# Patient Record
Sex: MALE | Race: BLACK OR AFRICAN AMERICAN | Employment: OTHER | URBAN - METROPOLITAN AREA
[De-identification: names, ages, dates, MRNs, and addresses within clinical notes are randomized per-mention and may not be internally consistent; named-entity substitution may affect disease eponyms.]

---

## 2020-07-17 ENCOUNTER — APPOINTMENT (OUTPATIENT)
Dept: GENERAL RADIOLOGY | Age: 45
DRG: 309 | End: 2020-07-17
Payer: MEDICAID

## 2020-07-17 ENCOUNTER — APPOINTMENT (OUTPATIENT)
Dept: CT IMAGING | Age: 45
DRG: 309 | End: 2020-07-17
Payer: MEDICAID

## 2020-07-17 ENCOUNTER — HOSPITAL ENCOUNTER (OUTPATIENT)
Age: 45
Setting detail: OBSERVATION
Discharge: HOME HEALTH | DRG: 309 | End: 2020-07-20
Attending: EMERGENCY MEDICINE | Admitting: ORTHOPAEDIC SURGERY
Payer: MEDICAID

## 2020-07-17 PROBLEM — S82.142A CLOSED FRACTURE OF LEFT TIBIAL PLATEAU: Status: ACTIVE | Noted: 2020-07-17

## 2020-07-17 LAB
ABO/RH: NORMAL
ANTIBODY SCREEN: NORMAL
APTT: 25.9 SEC (ref 24.5–35.1)
BASOPHILS ABSOLUTE: 0.04 E9/L (ref 0–0.2)
BASOPHILS RELATIVE PERCENT: 0.3 % (ref 0–2)
EOSINOPHILS ABSOLUTE: 0.1 E9/L (ref 0.05–0.5)
EOSINOPHILS RELATIVE PERCENT: 0.8 % (ref 0–6)
HCT VFR BLD CALC: 41.1 % (ref 37–54)
HEMOGLOBIN: 12.8 G/DL (ref 12.5–16.5)
IMMATURE GRANULOCYTES #: 0.07 E9/L
IMMATURE GRANULOCYTES %: 0.6 % (ref 0–5)
INR BLD: 1
LYMPHOCYTES ABSOLUTE: 1.87 E9/L (ref 1.5–4)
LYMPHOCYTES RELATIVE PERCENT: 15.6 % (ref 20–42)
MCH RBC QN AUTO: 29.8 PG (ref 26–35)
MCHC RBC AUTO-ENTMCNC: 31.1 % (ref 32–34.5)
MCV RBC AUTO: 95.6 FL (ref 80–99.9)
MONOCYTES ABSOLUTE: 0.55 E9/L (ref 0.1–0.95)
MONOCYTES RELATIVE PERCENT: 4.6 % (ref 2–12)
NEUTROPHILS ABSOLUTE: 9.38 E9/L (ref 1.8–7.3)
NEUTROPHILS RELATIVE PERCENT: 78.1 % (ref 43–80)
PDW BLD-RTO: 12.8 FL (ref 11.5–15)
PLATELET # BLD: 270 E9/L (ref 130–450)
PMV BLD AUTO: 10 FL (ref 7–12)
PROTHROMBIN TIME: 11.2 SEC (ref 9.3–12.4)
RBC # BLD: 4.3 E12/L (ref 3.8–5.8)
WBC # BLD: 12 E9/L (ref 4.5–11.5)

## 2020-07-17 PROCEDURE — 6360000002 HC RX W HCPCS: Performed by: STUDENT IN AN ORGANIZED HEALTH CARE EDUCATION/TRAINING PROGRAM

## 2020-07-17 PROCEDURE — 6360000002 HC RX W HCPCS: Performed by: EMERGENCY MEDICINE

## 2020-07-17 PROCEDURE — G0378 HOSPITAL OBSERVATION PER HR: HCPCS

## 2020-07-17 PROCEDURE — 71045 X-RAY EXAM CHEST 1 VIEW: CPT

## 2020-07-17 PROCEDURE — 86900 BLOOD TYPING SEROLOGIC ABO: CPT

## 2020-07-17 PROCEDURE — 99222 1ST HOSP IP/OBS MODERATE 55: CPT | Performed by: ORTHOPAEDIC SURGERY

## 2020-07-17 PROCEDURE — 86901 BLOOD TYPING SEROLOGIC RH(D): CPT

## 2020-07-17 PROCEDURE — 96375 TX/PRO/DX INJ NEW DRUG ADDON: CPT

## 2020-07-17 PROCEDURE — 73564 X-RAY EXAM KNEE 4 OR MORE: CPT

## 2020-07-17 PROCEDURE — 85730 THROMBOPLASTIN TIME PARTIAL: CPT

## 2020-07-17 PROCEDURE — 85610 PROTHROMBIN TIME: CPT

## 2020-07-17 PROCEDURE — 85025 COMPLETE CBC W/AUTO DIFF WBC: CPT

## 2020-07-17 PROCEDURE — 96374 THER/PROPH/DIAG INJ IV PUSH: CPT

## 2020-07-17 PROCEDURE — 73590 X-RAY EXAM OF LOWER LEG: CPT

## 2020-07-17 PROCEDURE — 73700 CT LOWER EXTREMITY W/O DYE: CPT

## 2020-07-17 PROCEDURE — 93005 ELECTROCARDIOGRAM TRACING: CPT | Performed by: ORTHOPAEDIC SURGERY

## 2020-07-17 PROCEDURE — 86850 RBC ANTIBODY SCREEN: CPT

## 2020-07-17 PROCEDURE — 2580000003 HC RX 258: Performed by: EMERGENCY MEDICINE

## 2020-07-17 PROCEDURE — 99284 EMERGENCY DEPT VISIT MOD MDM: CPT

## 2020-07-17 RX ORDER — FENTANYL CITRATE 50 UG/ML
50 INJECTION, SOLUTION INTRAMUSCULAR; INTRAVENOUS ONCE
Status: COMPLETED | OUTPATIENT
Start: 2020-07-17 | End: 2020-07-17

## 2020-07-17 RX ORDER — SODIUM CHLORIDE 0.9 % (FLUSH) 0.9 %
10 SYRINGE (ML) INJECTION EVERY 12 HOURS SCHEDULED
Status: DISCONTINUED | OUTPATIENT
Start: 2020-07-18 | End: 2020-07-18 | Stop reason: SDUPTHER

## 2020-07-17 RX ORDER — SODIUM CHLORIDE 9 MG/ML
INJECTION, SOLUTION INTRAVENOUS CONTINUOUS
Status: DISCONTINUED | OUTPATIENT
Start: 2020-07-17 | End: 2020-07-20 | Stop reason: HOSPADM

## 2020-07-17 RX ORDER — OXYCODONE HYDROCHLORIDE 10 MG/1
10 TABLET ORAL EVERY 4 HOURS PRN
Status: DISCONTINUED | OUTPATIENT
Start: 2020-07-17 | End: 2020-07-20 | Stop reason: HOSPADM

## 2020-07-17 RX ORDER — MORPHINE SULFATE 4 MG/ML
4 INJECTION, SOLUTION INTRAMUSCULAR; INTRAVENOUS
Status: DISCONTINUED | OUTPATIENT
Start: 2020-07-17 | End: 2020-07-20 | Stop reason: HOSPADM

## 2020-07-17 RX ORDER — SODIUM CHLORIDE 0.9 % (FLUSH) 0.9 %
10 SYRINGE (ML) INJECTION PRN
Status: DISCONTINUED | OUTPATIENT
Start: 2020-07-17 | End: 2020-07-18 | Stop reason: SDUPTHER

## 2020-07-17 RX ORDER — MORPHINE SULFATE 2 MG/ML
2 INJECTION, SOLUTION INTRAMUSCULAR; INTRAVENOUS
Status: DISCONTINUED | OUTPATIENT
Start: 2020-07-17 | End: 2020-07-20 | Stop reason: HOSPADM

## 2020-07-17 RX ORDER — DIPHENHYDRAMINE HYDROCHLORIDE 50 MG/ML
25 INJECTION INTRAMUSCULAR; INTRAVENOUS EVERY 6 HOURS PRN
Status: DISCONTINUED | OUTPATIENT
Start: 2020-07-17 | End: 2020-07-20 | Stop reason: HOSPADM

## 2020-07-17 RX ORDER — OXYCODONE HYDROCHLORIDE 5 MG/1
5 TABLET ORAL EVERY 4 HOURS PRN
Status: DISCONTINUED | OUTPATIENT
Start: 2020-07-17 | End: 2020-07-20 | Stop reason: HOSPADM

## 2020-07-17 RX ORDER — FENTANYL CITRATE 50 UG/ML
INJECTION, SOLUTION INTRAMUSCULAR; INTRAVENOUS
Status: DISPENSED
Start: 2020-07-17 | End: 2020-07-18

## 2020-07-17 RX ADMIN — FENTANYL CITRATE 50 MCG: 50 INJECTION, SOLUTION INTRAMUSCULAR; INTRAVENOUS at 20:30

## 2020-07-17 RX ADMIN — MORPHINE SULFATE 4 MG: 4 INJECTION, SOLUTION INTRAMUSCULAR; INTRAVENOUS at 23:38

## 2020-07-17 RX ADMIN — SODIUM CHLORIDE: 9 INJECTION, SOLUTION INTRAVENOUS at 20:29

## 2020-07-17 ASSESSMENT — PAIN DESCRIPTION - PAIN TYPE
TYPE: ACUTE PAIN
TYPE: ACUTE PAIN

## 2020-07-17 ASSESSMENT — PAIN SCALES - GENERAL
PAINLEVEL_OUTOF10: 10

## 2020-07-17 ASSESSMENT — PAIN DESCRIPTION - FREQUENCY: FREQUENCY: CONTINUOUS

## 2020-07-17 ASSESSMENT — PAIN DESCRIPTION - LOCATION
LOCATION: KNEE
LOCATION: KNEE

## 2020-07-17 ASSESSMENT — PAIN DESCRIPTION - ONSET: ONSET: ON-GOING

## 2020-07-17 ASSESSMENT — PAIN DESCRIPTION - ORIENTATION
ORIENTATION: LEFT
ORIENTATION: LEFT

## 2020-07-17 ASSESSMENT — PAIN DESCRIPTION - DESCRIPTORS: DESCRIPTORS: ACHING;SHARP;SHOOTING;THROBBING

## 2020-07-18 ENCOUNTER — ANESTHESIA EVENT (OUTPATIENT)
Dept: OPERATING ROOM | Age: 45
DRG: 309 | End: 2020-07-18
Payer: MEDICAID

## 2020-07-18 ENCOUNTER — APPOINTMENT (OUTPATIENT)
Dept: GENERAL RADIOLOGY | Age: 45
DRG: 309 | End: 2020-07-18
Payer: MEDICAID

## 2020-07-18 ENCOUNTER — ANESTHESIA (OUTPATIENT)
Dept: OPERATING ROOM | Age: 45
DRG: 309 | End: 2020-07-18
Payer: MEDICAID

## 2020-07-18 VITALS — TEMPERATURE: 93.7 F | OXYGEN SATURATION: 100 % | SYSTOLIC BLOOD PRESSURE: 103 MMHG | DIASTOLIC BLOOD PRESSURE: 69 MMHG

## 2020-07-18 LAB
ANION GAP SERPL CALCULATED.3IONS-SCNC: 14 MMOL/L (ref 7–16)
BUN BLDV-MCNC: 7 MG/DL (ref 6–20)
CALCIUM SERPL-MCNC: 8.7 MG/DL (ref 8.6–10.2)
CHLORIDE BLD-SCNC: 102 MMOL/L (ref 98–107)
CO2: 23 MMOL/L (ref 22–29)
CREAT SERPL-MCNC: 0.9 MG/DL (ref 0.7–1.2)
GFR AFRICAN AMERICAN: >60
GFR NON-AFRICAN AMERICAN: >60 ML/MIN/1.73
GLUCOSE BLD-MCNC: 98 MG/DL (ref 74–99)
HCT VFR BLD CALC: 37.7 % (ref 37–54)
HEMOGLOBIN: 11.8 G/DL (ref 12.5–16.5)
MCH RBC QN AUTO: 29.6 PG (ref 26–35)
MCHC RBC AUTO-ENTMCNC: 31.3 % (ref 32–34.5)
MCV RBC AUTO: 94.5 FL (ref 80–99.9)
PDW BLD-RTO: 12.9 FL (ref 11.5–15)
PLATELET # BLD: 218 E9/L (ref 130–450)
PMV BLD AUTO: 10.1 FL (ref 7–12)
POTASSIUM REFLEX MAGNESIUM: 3.7 MMOL/L (ref 3.5–5)
RBC # BLD: 3.99 E12/L (ref 3.8–5.8)
SODIUM BLD-SCNC: 139 MMOL/L (ref 132–146)
WBC # BLD: 9.7 E9/L (ref 4.5–11.5)

## 2020-07-18 PROCEDURE — 2580000003 HC RX 258

## 2020-07-18 PROCEDURE — 2500000003 HC RX 250 WO HCPCS

## 2020-07-18 PROCEDURE — 6360000002 HC RX W HCPCS: Performed by: ANESTHESIOLOGIST ASSISTANT

## 2020-07-18 PROCEDURE — 3700000000 HC ANESTHESIA ATTENDED CARE: Performed by: ORTHOPAEDIC SURGERY

## 2020-07-18 PROCEDURE — 6370000000 HC RX 637 (ALT 250 FOR IP): Performed by: FAMILY MEDICINE

## 2020-07-18 PROCEDURE — 6360000002 HC RX W HCPCS: Performed by: STUDENT IN AN ORGANIZED HEALTH CARE EDUCATION/TRAINING PROGRAM

## 2020-07-18 PROCEDURE — G0378 HOSPITAL OBSERVATION PER HR: HCPCS

## 2020-07-18 PROCEDURE — 6370000000 HC RX 637 (ALT 250 FOR IP): Performed by: STUDENT IN AN ORGANIZED HEALTH CARE EDUCATION/TRAINING PROGRAM

## 2020-07-18 PROCEDURE — 85027 COMPLETE CBC AUTOMATED: CPT

## 2020-07-18 PROCEDURE — 27536 TREAT KNEE FRACTURE: CPT | Performed by: ORTHOPAEDIC SURGERY

## 2020-07-18 PROCEDURE — 6370000000 HC RX 637 (ALT 250 FOR IP): Performed by: ORTHOPAEDIC SURGERY

## 2020-07-18 PROCEDURE — 7100000000 HC PACU RECOVERY - FIRST 15 MIN: Performed by: ORTHOPAEDIC SURGERY

## 2020-07-18 PROCEDURE — 6360000002 HC RX W HCPCS: Performed by: INTERNAL MEDICINE

## 2020-07-18 PROCEDURE — 36415 COLL VENOUS BLD VENIPUNCTURE: CPT

## 2020-07-18 PROCEDURE — 3209999900 FLUORO FOR SURGICAL PROCEDURES

## 2020-07-18 PROCEDURE — C1713 ANCHOR/SCREW BN/BN,TIS/BN: HCPCS | Performed by: ORTHOPAEDIC SURGERY

## 2020-07-18 PROCEDURE — 3700000001 HC ADD 15 MINUTES (ANESTHESIA): Performed by: ORTHOPAEDIC SURGERY

## 2020-07-18 PROCEDURE — 6360000002 HC RX W HCPCS

## 2020-07-18 PROCEDURE — 3600000005 HC SURGERY LEVEL 5 BASE: Performed by: ORTHOPAEDIC SURGERY

## 2020-07-18 PROCEDURE — 2580000003 HC RX 258: Performed by: STUDENT IN AN ORGANIZED HEALTH CARE EDUCATION/TRAINING PROGRAM

## 2020-07-18 PROCEDURE — 7100000001 HC PACU RECOVERY - ADDTL 15 MIN: Performed by: ORTHOPAEDIC SURGERY

## 2020-07-18 PROCEDURE — 73590 X-RAY EXAM OF LOWER LEG: CPT

## 2020-07-18 PROCEDURE — 2709999900 HC NON-CHARGEABLE SUPPLY: Performed by: ORTHOPAEDIC SURGERY

## 2020-07-18 PROCEDURE — 73560 X-RAY EXAM OF KNEE 1 OR 2: CPT

## 2020-07-18 PROCEDURE — 3600000015 HC SURGERY LEVEL 5 ADDTL 15MIN: Performed by: ORTHOPAEDIC SURGERY

## 2020-07-18 PROCEDURE — 2500000003 HC RX 250 WO HCPCS: Performed by: ANESTHESIOLOGIST ASSISTANT

## 2020-07-18 PROCEDURE — 80048 BASIC METABOLIC PNL TOTAL CA: CPT

## 2020-07-18 PROCEDURE — 2700000000 HC OXYGEN THERAPY PER DAY

## 2020-07-18 PROCEDURE — 2720000010 HC SURG SUPPLY STERILE: Performed by: ORTHOPAEDIC SURGERY

## 2020-07-18 DEVICE — SCREW BNE L28MM DIA4.5MM PROX CORT TIB S STL ST LOK FULL: Type: IMPLANTABLE DEVICE | Site: TIBIA | Status: FUNCTIONAL

## 2020-07-18 DEVICE — IMPLANTABLE DEVICE: Type: IMPLANTABLE DEVICE | Site: TIBIA | Status: FUNCTIONAL

## 2020-07-18 DEVICE — SCREW BNE L30MM DIA4.5MM PROX CORT TIB S STL ST LOK FULL: Type: IMPLANTABLE DEVICE | Site: TIBIA | Status: FUNCTIONAL

## 2020-07-18 DEVICE — SCREW BNE L32MM DIA4.5MM PROX CORT TIB S STL ST LOK FULL: Type: IMPLANTABLE DEVICE | Site: TIBIA | Status: FUNCTIONAL

## 2020-07-18 DEVICE — GRAFT BNE SUB M 5ML CANC DBM FRMBL CELLULAR VIVIGEN: Type: IMPLANTABLE DEVICE | Site: TIBIA | Status: FUNCTIONAL

## 2020-07-18 DEVICE — SCREW BNE L70MM DIA5MM S STL ST LOK FULL THRD T25 STARDRV: Type: IMPLANTABLE DEVICE | Site: TIBIA | Status: FUNCTIONAL

## 2020-07-18 DEVICE — SCREW BNE L40MM DIA4.5MM PROX CORT TIB S STL ST LOK FULL: Type: IMPLANTABLE DEVICE | Site: TIBIA | Status: FUNCTIONAL

## 2020-07-18 DEVICE — SCREW BNE L85MM DIA5MM S STL ST LOK FULL THRD T25 STARDRV: Type: IMPLANTABLE DEVICE | Site: TIBIA | Status: FUNCTIONAL

## 2020-07-18 DEVICE — SCREW BNE L80MM DIA5MM S STL ST LOK FULL THRD T25 STARDRV: Type: IMPLANTABLE DEVICE | Site: TIBIA | Status: FUNCTIONAL

## 2020-07-18 RX ORDER — KETOROLAC TROMETHAMINE 30 MG/ML
15 INJECTION, SOLUTION INTRAMUSCULAR; INTRAVENOUS EVERY 6 HOURS PRN
Status: DISCONTINUED | OUTPATIENT
Start: 2020-07-18 | End: 2020-07-20 | Stop reason: HOSPADM

## 2020-07-18 RX ORDER — GLYCOPYRROLATE 1 MG/5 ML
SYRINGE (ML) INTRAVENOUS PRN
Status: DISCONTINUED | OUTPATIENT
Start: 2020-07-18 | End: 2020-07-18 | Stop reason: SDUPTHER

## 2020-07-18 RX ORDER — SODIUM CHLORIDE 0.9 % (FLUSH) 0.9 %
10 SYRINGE (ML) INJECTION PRN
Status: DISCONTINUED | OUTPATIENT
Start: 2020-07-18 | End: 2020-07-20 | Stop reason: HOSPADM

## 2020-07-18 RX ORDER — ONDANSETRON 2 MG/ML
INJECTION INTRAMUSCULAR; INTRAVENOUS PRN
Status: DISCONTINUED | OUTPATIENT
Start: 2020-07-18 | End: 2020-07-18 | Stop reason: SDUPTHER

## 2020-07-18 RX ORDER — HYDROMORPHONE HCL 110MG/55ML
PATIENT CONTROLLED ANALGESIA SYRINGE INTRAVENOUS PRN
Status: DISCONTINUED | OUTPATIENT
Start: 2020-07-18 | End: 2020-07-18 | Stop reason: SDUPTHER

## 2020-07-18 RX ORDER — MORPHINE SULFATE 2 MG/ML
1 INJECTION, SOLUTION INTRAMUSCULAR; INTRAVENOUS EVERY 5 MIN PRN
Status: DISCONTINUED | OUTPATIENT
Start: 2020-07-18 | End: 2020-07-18 | Stop reason: HOSPADM

## 2020-07-18 RX ORDER — IBUPROFEN 400 MG/1
400 TABLET ORAL EVERY 6 HOURS PRN
Status: DISCONTINUED | OUTPATIENT
Start: 2020-07-18 | End: 2020-07-20 | Stop reason: HOSPADM

## 2020-07-18 RX ORDER — PROMETHAZINE HYDROCHLORIDE 25 MG/ML
6.25 INJECTION, SOLUTION INTRAMUSCULAR; INTRAVENOUS EVERY 10 MIN PRN
Status: DISCONTINUED | OUTPATIENT
Start: 2020-07-18 | End: 2020-07-18 | Stop reason: HOSPADM

## 2020-07-18 RX ORDER — MEPERIDINE HYDROCHLORIDE 25 MG/ML
12.5 INJECTION INTRAMUSCULAR; INTRAVENOUS; SUBCUTANEOUS EVERY 5 MIN PRN
Status: DISCONTINUED | OUTPATIENT
Start: 2020-07-18 | End: 2020-07-18 | Stop reason: HOSPADM

## 2020-07-18 RX ORDER — ROCURONIUM BROMIDE 10 MG/ML
INJECTION, SOLUTION INTRAVENOUS PRN
Status: DISCONTINUED | OUTPATIENT
Start: 2020-07-18 | End: 2020-07-18 | Stop reason: SDUPTHER

## 2020-07-18 RX ORDER — NEOSTIGMINE METHYLSULFATE 1 MG/ML
INJECTION, SOLUTION INTRAVENOUS PRN
Status: DISCONTINUED | OUTPATIENT
Start: 2020-07-18 | End: 2020-07-18 | Stop reason: SDUPTHER

## 2020-07-18 RX ORDER — SODIUM CHLORIDE 9 MG/ML
INJECTION, SOLUTION INTRAVENOUS CONTINUOUS PRN
Status: DISCONTINUED | OUTPATIENT
Start: 2020-07-18 | End: 2020-07-18 | Stop reason: SDUPTHER

## 2020-07-18 RX ORDER — DEXAMETHASONE SODIUM PHOSPHATE 10 MG/ML
INJECTION INTRAMUSCULAR; INTRAVENOUS PRN
Status: DISCONTINUED | OUTPATIENT
Start: 2020-07-18 | End: 2020-07-18 | Stop reason: SDUPTHER

## 2020-07-18 RX ORDER — PROPOFOL 10 MG/ML
INJECTION, EMULSION INTRAVENOUS PRN
Status: DISCONTINUED | OUTPATIENT
Start: 2020-07-18 | End: 2020-07-18 | Stop reason: SDUPTHER

## 2020-07-18 RX ORDER — HYDROCODONE BITARTRATE AND ACETAMINOPHEN 5; 325 MG/1; MG/1
1 TABLET ORAL PRN
Status: DISCONTINUED | OUTPATIENT
Start: 2020-07-18 | End: 2020-07-18 | Stop reason: HOSPADM

## 2020-07-18 RX ORDER — ASPIRIN 325 MG
325 TABLET, DELAYED RELEASE (ENTERIC COATED) ORAL 2 TIMES DAILY
Qty: 56 TABLET | Refills: 0 | Status: SHIPPED | OUTPATIENT
Start: 2020-07-18 | End: 2020-08-15

## 2020-07-18 RX ORDER — HYDROCODONE BITARTRATE AND ACETAMINOPHEN 5; 325 MG/1; MG/1
2 TABLET ORAL PRN
Status: DISCONTINUED | OUTPATIENT
Start: 2020-07-18 | End: 2020-07-18 | Stop reason: HOSPADM

## 2020-07-18 RX ORDER — SODIUM CHLORIDE 0.9 % (FLUSH) 0.9 %
10 SYRINGE (ML) INJECTION EVERY 12 HOURS SCHEDULED
Status: DISCONTINUED | OUTPATIENT
Start: 2020-07-18 | End: 2020-07-20 | Stop reason: HOSPADM

## 2020-07-18 RX ORDER — OXYCODONE HYDROCHLORIDE 5 MG/1
5 TABLET ORAL EVERY 6 HOURS PRN
Qty: 28 TABLET | Refills: 0 | Status: SHIPPED | OUTPATIENT
Start: 2020-07-18 | End: 2020-07-25

## 2020-07-18 RX ORDER — FENTANYL CITRATE 50 UG/ML
INJECTION, SOLUTION INTRAMUSCULAR; INTRAVENOUS PRN
Status: DISCONTINUED | OUTPATIENT
Start: 2020-07-18 | End: 2020-07-18 | Stop reason: SDUPTHER

## 2020-07-18 RX ORDER — MIDAZOLAM HYDROCHLORIDE 1 MG/ML
INJECTION INTRAMUSCULAR; INTRAVENOUS PRN
Status: DISCONTINUED | OUTPATIENT
Start: 2020-07-18 | End: 2020-07-18 | Stop reason: SDUPTHER

## 2020-07-18 RX ORDER — MORPHINE SULFATE 2 MG/ML
2 INJECTION, SOLUTION INTRAMUSCULAR; INTRAVENOUS EVERY 5 MIN PRN
Status: DISCONTINUED | OUTPATIENT
Start: 2020-07-18 | End: 2020-07-18 | Stop reason: HOSPADM

## 2020-07-18 RX ORDER — DIAPER,BRIEF,INFANT-TODD,DISP
EACH MISCELLANEOUS PRN
Status: DISCONTINUED | OUTPATIENT
Start: 2020-07-18 | End: 2020-07-18 | Stop reason: ALTCHOICE

## 2020-07-18 RX ORDER — LIDOCAINE HYDROCHLORIDE 20 MG/ML
INJECTION, SOLUTION INTRAVENOUS PRN
Status: DISCONTINUED | OUTPATIENT
Start: 2020-07-18 | End: 2020-07-18 | Stop reason: SDUPTHER

## 2020-07-18 RX ADMIN — MIDAZOLAM 2 MG: 1 INJECTION INTRAMUSCULAR; INTRAVENOUS at 14:33

## 2020-07-18 RX ADMIN — PROPOFOL 200 MG: 10 INJECTION, EMULSION INTRAVENOUS at 14:37

## 2020-07-18 RX ADMIN — MORPHINE SULFATE 2 MG: 2 INJECTION, SOLUTION INTRAMUSCULAR; INTRAVENOUS at 12:50

## 2020-07-18 RX ADMIN — FENTANYL CITRATE 100 MCG: 50 INJECTION, SOLUTION INTRAMUSCULAR; INTRAVENOUS at 14:33

## 2020-07-18 RX ADMIN — Medication 0.6 MG: at 16:06

## 2020-07-18 RX ADMIN — OXYCODONE HYDROCHLORIDE 10 MG: 10 TABLET ORAL at 00:27

## 2020-07-18 RX ADMIN — SODIUM CHLORIDE: 9 INJECTION, SOLUTION INTRAVENOUS at 14:33

## 2020-07-18 RX ADMIN — DEXAMETHASONE SODIUM PHOSPHATE 10 MG: 10 INJECTION INTRAMUSCULAR; INTRAVENOUS at 14:37

## 2020-07-18 RX ADMIN — HYDROMORPHONE HYDROCHLORIDE 1 MG: 2 INJECTION, SOLUTION INTRAMUSCULAR; INTRAVENOUS; SUBCUTANEOUS at 15:00

## 2020-07-18 RX ADMIN — LIDOCAINE HYDROCHLORIDE 100 MG: 20 INJECTION, SOLUTION INTRAVENOUS at 14:37

## 2020-07-18 RX ADMIN — KETOROLAC TROMETHAMINE 15 MG: 30 INJECTION, SOLUTION INTRAMUSCULAR at 01:10

## 2020-07-18 RX ADMIN — Medication 10 ML: at 20:32

## 2020-07-18 RX ADMIN — MORPHINE SULFATE 4 MG: 4 INJECTION, SOLUTION INTRAMUSCULAR; INTRAVENOUS at 08:18

## 2020-07-18 RX ADMIN — ROCURONIUM BROMIDE 50 MG: 10 INJECTION, SOLUTION INTRAVENOUS at 14:37

## 2020-07-18 RX ADMIN — Medication 2 G: at 14:47

## 2020-07-18 RX ADMIN — HYDROMORPHONE HYDROCHLORIDE 1 MG: 2 INJECTION, SOLUTION INTRAMUSCULAR; INTRAVENOUS; SUBCUTANEOUS at 14:52

## 2020-07-18 RX ADMIN — ONDANSETRON HYDROCHLORIDE 4 MG: 2 INJECTION, SOLUTION INTRAMUSCULAR; INTRAVENOUS at 14:37

## 2020-07-18 RX ADMIN — ASPIRIN 325 MG: 325 TABLET, COATED ORAL at 20:32

## 2020-07-18 RX ADMIN — Medication 2 G: at 23:59

## 2020-07-18 RX ADMIN — SODIUM CHLORIDE: 9 INJECTION, SOLUTION INTRAVENOUS at 15:04

## 2020-07-18 RX ADMIN — IBUPROFEN 400 MG: 400 TABLET, FILM COATED ORAL at 23:43

## 2020-07-18 RX ADMIN — FENTANYL CITRATE 150 MCG: 50 INJECTION, SOLUTION INTRAMUSCULAR; INTRAVENOUS at 14:37

## 2020-07-18 RX ADMIN — SODIUM CHLORIDE, PRESERVATIVE FREE 10 ML: 5 INJECTION INTRAVENOUS at 12:51

## 2020-07-18 RX ADMIN — Medication 3 MG: at 16:06

## 2020-07-18 ASSESSMENT — PULMONARY FUNCTION TESTS
PIF_VALUE: 23
PIF_VALUE: 24
PIF_VALUE: 23
PIF_VALUE: 24
PIF_VALUE: 23
PIF_VALUE: 23
PIF_VALUE: 24
PIF_VALUE: 23
PIF_VALUE: 24
PIF_VALUE: 16
PIF_VALUE: 23
PIF_VALUE: 23
PIF_VALUE: 20
PIF_VALUE: 20
PIF_VALUE: 23
PIF_VALUE: 23
PIF_VALUE: 20
PIF_VALUE: 23
PIF_VALUE: 24
PIF_VALUE: 12
PIF_VALUE: 24
PIF_VALUE: 23
PIF_VALUE: 19
PIF_VALUE: 24
PIF_VALUE: 23
PIF_VALUE: 5
PIF_VALUE: 16
PIF_VALUE: 24
PIF_VALUE: 23
PIF_VALUE: 20
PIF_VALUE: 22
PIF_VALUE: 2
PIF_VALUE: 24
PIF_VALUE: 24
PIF_VALUE: 23
PIF_VALUE: 23
PIF_VALUE: 24
PIF_VALUE: 23
PIF_VALUE: 16
PIF_VALUE: 23
PIF_VALUE: 16
PIF_VALUE: 24
PIF_VALUE: 22
PIF_VALUE: 23
PIF_VALUE: 20
PIF_VALUE: 23
PIF_VALUE: 23
PIF_VALUE: 16
PIF_VALUE: 24
PIF_VALUE: 24
PIF_VALUE: 2
PIF_VALUE: 23
PIF_VALUE: 22
PIF_VALUE: 2
PIF_VALUE: 23
PIF_VALUE: 22
PIF_VALUE: 23
PIF_VALUE: 22
PIF_VALUE: 23
PIF_VALUE: 23
PIF_VALUE: 24
PIF_VALUE: 23
PIF_VALUE: 23
PIF_VALUE: 24
PIF_VALUE: 22
PIF_VALUE: 23
PIF_VALUE: 23
PIF_VALUE: 2
PIF_VALUE: 21
PIF_VALUE: 23
PIF_VALUE: 24
PIF_VALUE: 23
PIF_VALUE: 24
PIF_VALUE: 17
PIF_VALUE: 23
PIF_VALUE: 0
PIF_VALUE: 23
PIF_VALUE: 24
PIF_VALUE: 23
PIF_VALUE: 21
PIF_VALUE: 0
PIF_VALUE: 23
PIF_VALUE: 2
PIF_VALUE: 19
PIF_VALUE: 23
PIF_VALUE: 21
PIF_VALUE: 23
PIF_VALUE: 22
PIF_VALUE: 23
PIF_VALUE: 23
PIF_VALUE: 0

## 2020-07-18 ASSESSMENT — PAIN DESCRIPTION - FREQUENCY
FREQUENCY: CONTINUOUS

## 2020-07-18 ASSESSMENT — PAIN - FUNCTIONAL ASSESSMENT
PAIN_FUNCTIONAL_ASSESSMENT: PREVENTS OR INTERFERES SOME ACTIVE ACTIVITIES AND ADLS
PAIN_FUNCTIONAL_ASSESSMENT: PREVENTS OR INTERFERES SOME ACTIVE ACTIVITIES AND ADLS

## 2020-07-18 ASSESSMENT — PAIN SCALES - WONG BAKER: WONGBAKER_NUMERICALRESPONSE: 0

## 2020-07-18 ASSESSMENT — PAIN DESCRIPTION - LOCATION
LOCATION: KNEE
LOCATION: LEG
LOCATION: LEG
LOCATION: KNEE
LOCATION: HIP;LEG
LOCATION: LEG

## 2020-07-18 ASSESSMENT — PAIN DESCRIPTION - ONSET
ONSET: ON-GOING

## 2020-07-18 ASSESSMENT — PAIN SCALES - GENERAL
PAINLEVEL_OUTOF10: 0
PAINLEVEL_OUTOF10: 8
PAINLEVEL_OUTOF10: 0
PAINLEVEL_OUTOF10: 10
PAINLEVEL_OUTOF10: 7
PAINLEVEL_OUTOF10: 8
PAINLEVEL_OUTOF10: 0
PAINLEVEL_OUTOF10: 10
PAINLEVEL_OUTOF10: 6
PAINLEVEL_OUTOF10: 0
PAINLEVEL_OUTOF10: 0
PAINLEVEL_OUTOF10: 9
PAINLEVEL_OUTOF10: 10

## 2020-07-18 ASSESSMENT — PAIN DESCRIPTION - PAIN TYPE
TYPE: ACUTE PAIN
TYPE: SURGICAL PAIN
TYPE: ACUTE PAIN

## 2020-07-18 ASSESSMENT — LIFESTYLE VARIABLES: SMOKING_STATUS: 1

## 2020-07-18 ASSESSMENT — PAIN DESCRIPTION - DESCRIPTORS
DESCRIPTORS: ACHING;CONSTANT;DISCOMFORT
DESCRIPTORS: ACHING;SHARP;SHOOTING;THROBBING
DESCRIPTORS: ACHING;SHARP;SHOOTING;THROBBING
DESCRIPTORS: ACHING;CONSTANT;DISCOMFORT
DESCRIPTORS: SHARP;SHOOTING;ACHING
DESCRIPTORS: OTHER (COMMENT)

## 2020-07-18 ASSESSMENT — PAIN DESCRIPTION - PROGRESSION
CLINICAL_PROGRESSION: NOT CHANGED
CLINICAL_PROGRESSION: NOT CHANGED

## 2020-07-18 ASSESSMENT — PAIN DESCRIPTION - ORIENTATION
ORIENTATION: LEFT

## 2020-07-18 NOTE — H&P
Department of Orthopedic Surgery  History and physical exam          Reason for Consult:  Left knee pain    HISTORY OF PRESENT ILLNESS:       Patient is a 38 y.o. male who presents with left knee pain after direct trauma. Patient states he was moving a basketball hoop when he fell injuring his left leg. Patient does not remember the exact mechanism of injury. Anticoagulation - none. Patient states he had multiple sprains to left knee when he was young. Patient denies any difficulty with ambulation left leg prior to injury. Denies numbness/tingling/paresthesias. Denies any other orthopedic complaints at this time. Past Medical History:    History reviewed. No pertinent past medical history. Past Surgical History:    History reviewed. No pertinent surgical history. Current Medications:   Current Facility-Administered Medications: 0.9 % sodium chloride infusion, , Intravenous, Continuous  Allergies:  Patient has no known allergies. Social History:   TOBACCO:   reports that he has never smoked. He has never used smokeless tobacco.  ETOH:   has no history on file for alcohol. DRUGS:   has no history on file for drug. ACTIVITIES OF DAILY LIVING:    OCCUPATION:    Family History:   History reviewed. No pertinent family history.     REVIEW OF SYSTEMS:  CONSTITUTIONAL:  negative for  fevers, chills  EYES:  negative for blurred vision, visual disturbance  HEENT:  negative for  hearing loss, voice change  RESPIRATORY:  negative for  dyspnea, wheezing  CARDIOVASCULAR:  negative for  chest pain, palpitations  GASTROINTESTINAL:  negative for nausea, vomiting  GENITOURINARY:  negative for frequency, urinary incontinence  HEMATOLOGIC/LYMPHATIC:  negative for bleeding and petechiae  MUSCULOSKELETAL:  positive for left knee pain  NEUROLOGICAL:  negative for headaches, dizziness  BEHAVIOR/PSYCH:  negative for increased agitation and anxiety    PHYSICAL EXAM:    VITALS:  BP (!) 132/94   Pulse 89   Temp 98.2 °F (36.8 have performed the clemens components of the history and physical examination and concur completely with the findings and plans as documented. Agree with ROS, examination, FMH, PMH, PSH, SocHx, and allergies as above. Patient physically seen and examined. Moving bowels appropriately, problems significant pain in his right knee. Found have a left Schatzker 6 tibial plateau fracture. Talk to him detail about the fixation. I explained the risks and complications of surgery with the patient including but not limited to death from anesthesia, possible neurovascular damage, possible infection, possible nonunion, possible hardware failure, possible need for further surgery, etc.  Patient understood this, asked appropriate questions and decided to go forward with the procedure. Physical Examination:   General appearance: alert, well appearing, and in no distress,  normal appearing weight. No visible signs of trauma   Mental status: alert, oriented to person, place, and time, normal mood, behavior, speech, dress, motor activity, and thought processes  Abdomen: soft, nondistended  Resp:   resp easy and unlabored, no audible wheezes note, normal symmetrical expansion of both hemithoraces  Cardiac: distal pulses palpable, skin and extremities well perfused  Neurological: alert, oriented X3, normal speech, no focal findings or movement disorder noted, motor and sensory grossly normal bilaterally, normal muscle tone, no tremors  HEENT: normochephalic atraumatic, external ears and eyes normal, sclera normal, neck supple, no nasal discharge. Extremities:   peripheral pulses normal, no edema, redness or tenderness in the calves   Skin: normal coloration, no rashes or open wounds, no suspicious skin lesions noted  Psych: Affect euthymic   Musculoskeletal:   Extremity:  Agree with examination. Left leg with minimal swelling to the low review mechanism. Has a positive wrinkle sign.   Can proceed with definitive open reduction internal fixation        ELECTRONICALLY signed by:    Alena Mcgrath MD  7/18/20   This is been dictated utilizing voice recognition software. All efforts have been made to make the note accurate although inadvertent errors may be present.

## 2020-07-18 NOTE — CONSULTS
Inpatient consult to Hospitalist  Consult performed by: Kavita Godwin DO  Consult ordered by: Yonis Tao, [address] [facility]    PCP: No primary care provider on file. Date of Admission: 7/17/2020    Date of Service: Pt seen/examined on 7/18/2020     Reason for Consult: Medical management. Chief Complaint:  Left lower extremity pain. History Of Present Illness:      38 y.o. male who presented to [facility] with Left knee pain after a fall sustained while moving a basketball hoop. Patient denies hitting his head or any loss of consciousness. He states his pain is not well controlled. He continues to ask for Ibuprofen. He has no past medical history for which he takes no medications. He was able to ambulate up a flight of stairs without difficulty prior to this accident. Past Medical History:      History reviewed. No pertinent past medical history. Past Surgical History:      History reviewed. No pertinent surgical history. Medications Prior to Admission:      Prior to Admission medications    Not on File       Allergies:  Patient has no known allergies. Social History:      TOBACCO:   reports that he has been smoking. He has been smoking about 0.50 packs per day. He has never used smokeless tobacco.  ETOH:   reports current alcohol use of about 12.0 standard drinks of alcohol per week. Family History:      Reviewed in detail and negative for DM, CAD, Cancer, CVA. REVIEW OF SYSTEMS:   Pertinent positives as noted in the HPI. All other systems reviewed and negative. PHYSICAL EXAM:    /83   Pulse 99   Temp 99.9 °F (37.7 °C) (Temporal)   Resp 16   Ht 5' 4\" (1.626 m)   Wt 140 lb (63.5 kg)   SpO2 98%   BMI 24.03 kg/m²     General appearance:  Mild distress due to pain, appears stated age and cooperative. HEENT:  Normal cephalic, atraumatic without obvious deformity. Pupils equal, round, and reactive to light.   Extra ocular muscles intact. Conjunctivae/corneas clear. Neck: Supple, with full range of motion. No jugular venous distention. Trachea midline. Respiratory:  Normal respiratory effort. Clear to auscultation, bilaterally without Rales/Wheezes/Rhonchi. Cardiovascular:  Regular rate and rhythm with normal S1/S2 without murmurs, rubs or gallops. Abdomen: Soft, non-tender, non-distended with normal bowel sounds. Musculoskeletal:  No clubbing or cyanosis. Left leg in immobilizer. Skin: Skin color, texture, turgor normal.  No rashes or lesions. Neurologic:  Neurovascularly intact without any focal sensory/motor deficits. Cranial nerves: II-XII intact, grossly non-focal.  Psychiatric:  Alert and oriented, thought content appropriate, normal insight    CXR:  I have reviewed the CXR with the following interpretation:      No airspace opacities or pleural effusion. EKG:  I have reviewed the EKG. Labs:     Recent Labs     07/17/20 2117   WBC 12.0*   HGB 12.8   HCT 41.1        No results for input(s): NA, K, CL, CO2, BUN, CREATININE, CALCIUM, PHOS in the last 72 hours. Invalid input(s): MAGNES  No results for input(s): AST, ALT, BILIDIR, BILITOT, ALKPHOS in the last 72 hours. Recent Labs     07/17/20 2117   INR 1.0     No results for input(s): Serene [place] in the last 72 hours. Urinalysis:    No results found for: Donneta Pott, BACTERIA, RBCUA, BLOODU, SPECGRAV, GLUCOSEU      ASSESSMENT:    Active Hospital Problems    Diagnosis Date Noted    Closed fracture of left tibial plateau [W42.105L] 95/22/3739   1. Close left tibial plateau fracture  2. Tobacco Abuse  3. Preoperative clearance  4. Leukocytosis      PLAN:  Admitted to orthopedics. Add Toradol to help with pain. Will check Kidney liver function. Patient very low risk for planned procedure. Chance of MACE is 0.4%. Patient has no medical issues besides orthopedics and pain control. Medicine will sign off.      Pre-Operative Risk assessment using 2014 ACC/AHA guidelines     Emergent procedure No  Active Cardiac Condition No (decompensated HF, Arrhythmia, MI <3 weeks, severe valve disease)  Risk Level of Procedure Intermediate Risk (intraperitoneal, intrathoracic, HENT, orthopedic, or carotid endarterectomy, etc.)  Revised Cardiac Risk Index Risk factors: None  Measurement of Exercise Tolerance before Surgery >4 Yes    According to the 2014 ACC/AHA pre-operative risk assessment guidelines Dwight Selby is a low risk for major cardiac complications during a intermediate risk procedure and may continue as planned. Specific medication recommendations are listed below. Medications recommended to continue should be taken with a sip of water even when NPO. Further recommendations from consultants: None    Medication Recommendations:  N/A. DVT Prophylaxis: Lovenox when OK with orthopedics. Diet: Diet NPO, After Midnight  Code Status: Full Code         Annia Corral DO    Thank you Dr. Mary Rosen for the opportunity to be involved in this patient's care. If you have any questions or concerns please feel free to contact me at [phone].

## 2020-07-18 NOTE — ANESTHESIA PRE PROCEDURE
Department of Anesthesiology  Preprocedure Note       Name:  Brett Walker   Age:  38 y.o.  :  1975                                          MRN:  [id number]         Date:  2020      Surgeon: Adrian Braden):  Kimi Cohen MD    Procedure: Procedure(s):  TIBIAL PLATEAU OPEN REDUCTION INTERNAL FIXATION    Medications prior to admission:   Prior to Admission medications    Not on File       Current medications:    Current Facility-Administered Medications   Medication Dose Route Frequency Provider Last Rate Last Dose    ketorolac (TORADOL) injection 15 mg  15 mg Intravenous Q6H PRN Annia BAUER Ellis, DO   15 mg at 20 0110    0.9 % sodium chloride infusion   Intravenous Continuous Verlie Loser,  mL/hr at 20 2029      sodium chloride flush 0.9 % injection 10 mL  10 mL Intravenous 2 times per day Verlie Loser, DO        sodium chloride flush 0.9 % injection 10 mL  10 mL Intravenous PRN Verlie Loser, DO   10 mL at 20 1251    morphine (PF) injection 2 mg  2 mg Intravenous Q2H PRN Verlie Loser, DO   2 mg at 20 1250    Or    morphine sulfate (PF) injection 4 mg  4 mg Intravenous Q2H PRN Verlie Loser, DO   4 mg at 20 0818    ceFAZolin (ANCEF) 2 g in sterile water 20 mL IV syringe  2 g Intravenous Q8H Verlie Loser, DO        oxyCODONE (ROXICODONE) immediate release tablet 5 mg  5 mg Oral Q4H PRN Verlie Loser, DO        Or    oxyCODONE HCl (OXY-IR) immediate release tablet 10 mg  10 mg Oral Q4H PRN Verlie Loser, DO   10 mg at 20 0027    diphenhydrAMINE (BENADRYL) injection 25 mg  25 mg Intravenous Q6H PRN Verlie Loser, DO           Allergies:  No Known Allergies    Problem List:    Patient Active Problem List   Diagnosis Code    Closed fracture of left tibial plateau D29.923Y       Past Medical History:  History reviewed. No pertinent past medical history. Past Surgical History:  History reviewed.  No pertinent surgical history. Social History:    Social History     Tobacco Use    Smoking status: Current Every Day Smoker     Packs/day: 0.50    Smokeless tobacco: Never Used   Substance Use Topics    Alcohol use: Yes     Alcohol/week: 12.0 standard drinks     Types: 12 Cans of beer per week                                Ready to quit: Not Answered  Counseling given: Not Answered      Vital Signs (Current):   Vitals:    07/17/20 2009 07/17/20 2100 07/17/20 2200 07/17/20 2338   BP: 101/78 (!) 132/94 (!) 133/93 133/83   Pulse: 78 89 97 99   Resp: 20 20 20 16   Temp: 36.8 °C (98.2 °F)   37.7 °C (99.9 °F)   TempSrc:    Temporal   SpO2: 98% 98% 98%    Weight: 140 lb (63.5 kg)      Height: 5' 4\" (1.626 m)                                                 BP Readings from Last 3 Encounters:   07/17/20 133/83       NPO Status: Time of last liquid consumption: 1800                        Time of last solid consumption: 1800                        Date of last liquid consumption: 07/17/20                        Date of last solid food consumption: 07/17/20    BMI:   Wt Readings from Last 3 Encounters:   07/17/20 140 lb (63.5 kg)     Body mass index is 24.03 kg/m². CBC:   Lab Results   Component Value Date    WBC 9.7 07/18/2020    RBC 3.99 07/18/2020    HGB 11.8 07/18/2020    HCT 37.7 07/18/2020    MCV 94.5 07/18/2020    RDW 12.9 07/18/2020     07/18/2020       CMP:   Lab Results   Component Value Date     07/18/2020    K 3.7 07/18/2020     07/18/2020    CO2 23 07/18/2020    BUN 7 07/18/2020    CREATININE 0.9 07/18/2020    GFRAA >60 07/18/2020    LABGLOM >60 07/18/2020    GLUCOSE 98 07/18/2020    CALCIUM 8.7 07/18/2020       POC Tests: No results for input(s): POCGLU, POCNA, POCK, POCCL, POCBUN, POCHEMO, POCHCT in the last 72 hours.     Coags:   Lab Results   Component Value Date    PROTIME 11.2 07/17/2020    INR 1.0 07/17/2020    APTT 25.9 07/17/2020       HCG (If Applicable): No results found for: PREGTESTUR, PREGSERUM, HCG, HCGQUANT     ABGs: No results found for: PHART, PO2ART, LCA7HLA, OPY1KPL, BEART, X9ZZABCE     Type & Screen (If Applicable):  No results found for: LABABO, LABRH    Drug/Infectious Status (If Applicable):  No results found for: HIV, HEPCAB    COVID-19 Screening (If Applicable): No results found for: COVID19      Anesthesia Evaluation  Patient summary reviewed and Nursing notes reviewed no history of anesthetic complications:   Airway: Mallampati: III        Dental:      Comment: Very poor dentition    Pulmonary: breath sounds clear to auscultation  (+) current smoker          Patient did not smoke on day of surgery. Cardiovascular:    (+) angina: with exertion,         Rhythm: regular  Rate: normal           Beta Blocker:  Not on Beta Blocker         Neuro/Psych:               GI/Hepatic/Renal:             Endo/Other:                      ROS comment: Closed fracture of left tibial plateau  Abdominal:           Vascular:                                      Anesthesia Plan      general     ASA 3     (20g Right AC  18g Left AC)  Induction: intravenous. BIS  MIPS: Prophylactic antiemetics administered. Anesthetic plan and risks discussed with patient. Use of blood products discussed with patient whom consented to blood products. Plan discussed with CRNA and attending.                 Mingo Sharp RN   7/18/2020

## 2020-07-18 NOTE — BRIEF OP NOTE
Brief Postoperative Note      Patient: Shahla Lucero  YOB: 1975  MRN: [id number]    Date of Procedure: 7/18/2020    Pre-Op Diagnosis: Left tibial plateau FRACTURE    Post-Op Diagnosis: Same       Procedure(s):  LEFT TIBIAL PLATEAU OPEN REDUCTION INTERNAL FIXATION    Surgeon(s):  Denisa Jenkins MD    Assistant:  Resident: Kali Aguila DO    Anesthesia: General    Estimated Blood Loss (mL): less than 50     Complications: none    Specimens:   * No specimens in log *    Implants:  Implant Name Type Inv.  Item Serial No.  Lot No. LRB No. Used Action   PLATE TIB PROX LK LCP SS LT [id number] ST - [id number] Screw/Plate/Nail/Chele PLATE TIB PROX LK LCP SS LT 4.3P144ZE .043S SYNTHES  Left 1 Implanted   SCREW CORTX SLFTP LG FRAG 4.5X40MM - [id number] Screw/Plate/Nail/Chele SCREW CORTX SLFTP LG FRAG 4.5X40MM 214.840 SYNTHES  Left 1 Implanted   SCREW LK SLFTP W/ T25 5.0X80MM - [id number] Screw/Plate/Nail/Chele SCREW LK SLFTP W/ T25 5.0X80MM 212.225 SYNTHES  Left 1 Implanted   SCREW LK SLFTP W/ T25 5.0X85MM - [id number] Screw/Plate/Nail/Chele SCREW LK SLFTP W/ T25 5.0X85MM 212.226 SYNTHES  Left 2 Implanted   SCREW CORTX SLFTP LG FRAG 4.5X28MM - [id number] Screw/Plate/Nail/Chele SCREW CORTX SLFTP LG FRAG 4.5X28MM 214.828 SYNTHES  Left 1 Implanted   BONE MATRIX FORMABLE VIVIGEN 5CC - [id number] Bone/Graft/Tissue/Human/Synth BONE MATRIX FORMABLE VIVIGEN 5CC [id number] [facility] [id number] Left 1 Implanted   SCREW LK SLFTP W/ T25 5.0X70MM - [id number] Screw/Plate/Nail/Chele SCREW LK SLFTP W/ T25 5.0X70MM 212.223 SYNTHES  Left 1 Implanted   SCREW CORTX SLFTP LG FRAG 4.5X30MM - [id number] Screw/Plate/Nail/Chele SCREW CORTX SLFTP LG FRAG 4.5X30MM 214.830 SYNTHES  Left 1 Implanted   SCREW CORTX SLFTP LG FRAG 4.5X32MM - [id number] Screw/Plate/Nail/Chele SCREW CORTX SLFTP LG FRAG 4.5X32MM 214.832 SYNTHES  Left 1 Implanted         Drains: * No LDAs found *    Findings: see op note    Electronically signed by Lacie Patel Talia Calvert,  on 7/18/2020 at 4:07 PM

## 2020-07-18 NOTE — PROGRESS NOTES
OCCUPATIONAL THERAPY    Date:2020  Patient Name: Sophie Mckeon  MRN: [id number]  : 1975  Room: 5407/5407-A              Chart reviewed. Pt scheduled for surgery today. Will re-attempt at later time. Thank you for consult.     Kristine Fontana, OTR/L [id number]

## 2020-07-18 NOTE — OP NOTE
Operative Note      Patient: Nita Martinez  YOB: 1975  MRN: [id number]    Date of Procedure: 7/18/2020    Pre-Op Diagnosis: Left Schatzker 6 tibial plateau fracture    Post-Op Diagnosis: Same       Procedure(s):  Open reduction internal fixation left bicondylar tibial plateau fracture    Surgeon(s):  Ranjeet Huggins MD    Assistant:   Resident: Jimmie Martins DO    Anesthesia: General    Estimated Blood Loss (mL): less than 50     Complications: None    Specimens:   * No specimens in log *    Implants:  Implant Name Type Inv.  Item Serial No.  Lot No. LRB No. Used Action   PLATE TIB PROX LK LCP SS LT [id number] ST - S240.043S Screw/Plate/Nail/Chele PLATE TIB PROX LK LCP SS LT 4.6F222RA .043S SYNTHES  Left 1 Implanted   SCREW CORTX SLFTP LG FRAG 4.5X40MM - [id number] Screw/Plate/Nail/Chele SCREW CORTX SLFTP LG FRAG 4.5X40MM 214.840 SYNTHES  Left 1 Implanted   SCREW LK SLFTP W/ T25 5.0X80MM - S212.225 Screw/Plate/Nail/Chele SCREW LK SLFTP W/ T25 5.0X80MM 212.225 SYNTHES  Left 1 Implanted   SCREW LK SLFTP W/ T25 5.0X85MM - S212.226 Screw/Plate/Nail/Chele SCREW LK SLFTP W/ T25 5.0X85MM 212.226 SYNTHES  Left 2 Implanted   SCREW CORTX SLFTP LG FRAG 4.5X28MM - [id number] Screw/Plate/Nail/Chele SCREW CORTX SLFTP LG FRAG 4.5X28MM 214.828 SYNTHES  Left 1 Implanted   BONE MATRIX FORMABLE VIVIGEN 5CC - [id number] Bone/Graft/Tissue/Human/Synth BONE MATRIX FORMABLE VIVIGEN 5C [id number] [facility] [id number] Left 1 Implanted   SCREW LK SLFTP W/ T25 5.0X70MM - S212.223 Screw/Plate/Nail/Chele SCREW LK SLFTP W/ T25 5.0X70MM 212.223 SYNTHES  Left 1 Implanted   SCREW CORTX SLFTP LG FRAG 4.5X30MM - S214.830 Screw/Plate/Nail/Chele SCREW CORTX SLFTP LG FRAG 4.5X30MM 214.830 SYNTHES  Left 1 Implanted   SCREW CORTX SLFTP LG FRAG 4.5X32MM - S214.832 Screw/Plate/Nail/Chele SCREW CORTX SLFTP LG FRAG 4.5X32MM 214.832 SYNTHES  Left 1 Implanted         Drains: * No LDAs found *    Findings: Anatomic reduction proximal tibia    Detailed Description of Procedure:   Patient was brought to the operating room in a supine position on a hospital bed. Patient was transferred to the operating room table by multiple individuals in a safe fashion with anesthesia in control of the patient's C-spine and airway. Once on the operating table, all points of pressure were identified and well-padded. Patient had a tourniquet applied to the left upper thigh. His left lower extremity was sterilely prepped and draped in a standard orthopedic fashion. A timeout was then performed indicating the appropriate identification of the patient, the procedure to be performed, and the side to be performed upon. This was agreed upon by all individuals in the room. After the timeout was performed incision was marked out to utilize the percutaneous approach to the left proximal tibia. Esmarch was applied and tourniquet was elevated to 275 mmHg. 10 blade is made incision. Careful dissection was carried down to Valeri's tubercle which was subperiosteally dissected down to bone. The fracture was identified as well. It exited just below the tibial tubercle. The fracture was then anatomically reduced and clamped. A precontoured Synthes lateral 4.5 mm plate was then slid in the position. K wires were used to hold it tentatively. A bicortical screw was placed distal fracture dissected down to bone. K wires were K wires were placed the medial plateau and lifted out of varus. Locking screws were then placed across the joint with the reduction had occurred. A kickstand screw was placed to keep it out of varus. And we finished out the rest bicortical screws distally. Final x-rays showed good reduction of fracture with hardware in good position. 5 cc of stem cell graft were placed into the fracture due to the fact that it is a watershed area with a high nonunion rate.   Once the wound was copiously irrigated with sterile normal saline the fascia was closed

## 2020-07-18 NOTE — ED NOTES
[facility] faxed and floor called x2 with no answer. Pt to CT then floor, CT called and aware[facility] [address] [place] [address]  07/17/20 2501

## 2020-07-18 NOTE — ED PROVIDER NOTES
agreeable with the plan.      --------------------------------- IMPRESSION AND DISPOSITION ---------------------------------    IMPRESSION  1. Closed fracture of left tibial plateau, initial encounter    2. Closed fracture of left tibia and fibula, initial encounter        DISPOSITION  Disposition: Admit to med/surg floor  Patient condition is stable      NOTE: This report was transcribed using voice recognition software.  Every effort was made to ensure accuracy; however, inadvertent computerized transcription errors may be present       Juan Avery MD  07/17/20 3922

## 2020-07-19 LAB
EKG ATRIAL RATE: 89 BPM
EKG P AXIS: 64 DEGREES
EKG P-R INTERVAL: 168 MS
EKG Q-T INTERVAL: 334 MS
EKG QRS DURATION: 90 MS
EKG QTC CALCULATION (BAZETT): 406 MS
EKG R AXIS: 87 DEGREES
EKG T AXIS: 65 DEGREES
EKG VENTRICULAR RATE: 89 BPM

## 2020-07-19 PROCEDURE — 97530 THERAPEUTIC ACTIVITIES: CPT

## 2020-07-19 PROCEDURE — G0378 HOSPITAL OBSERVATION PER HR: HCPCS

## 2020-07-19 PROCEDURE — 1200000000 HC SEMI PRIVATE

## 2020-07-19 PROCEDURE — 2580000003 HC RX 258: Performed by: STUDENT IN AN ORGANIZED HEALTH CARE EDUCATION/TRAINING PROGRAM

## 2020-07-19 PROCEDURE — 97535 SELF CARE MNGMENT TRAINING: CPT

## 2020-07-19 PROCEDURE — 93010 ELECTROCARDIOGRAM REPORT: CPT | Performed by: INTERNAL MEDICINE

## 2020-07-19 PROCEDURE — 97166 OT EVAL MOD COMPLEX 45 MIN: CPT

## 2020-07-19 PROCEDURE — 97161 PT EVAL LOW COMPLEX 20 MIN: CPT

## 2020-07-19 PROCEDURE — 6370000000 HC RX 637 (ALT 250 FOR IP): Performed by: FAMILY MEDICINE

## 2020-07-19 PROCEDURE — 6370000000 HC RX 637 (ALT 250 FOR IP): Performed by: STUDENT IN AN ORGANIZED HEALTH CARE EDUCATION/TRAINING PROGRAM

## 2020-07-19 RX ADMIN — ASPIRIN 325 MG: 325 TABLET, COATED ORAL at 09:05

## 2020-07-19 RX ADMIN — ASPIRIN 325 MG: 325 TABLET, COATED ORAL at 20:08

## 2020-07-19 RX ADMIN — IBUPROFEN 400 MG: 400 TABLET, FILM COATED ORAL at 15:45

## 2020-07-19 RX ADMIN — IBUPROFEN 400 MG: 400 TABLET, FILM COATED ORAL at 09:05

## 2020-07-19 RX ADMIN — Medication 10 ML: at 09:06

## 2020-07-19 ASSESSMENT — PAIN DESCRIPTION - DESCRIPTORS: DESCRIPTORS: NUMBNESS;DISCOMFORT;TINGLING

## 2020-07-19 ASSESSMENT — PAIN SCALES - GENERAL
PAINLEVEL_OUTOF10: 3
PAINLEVEL_OUTOF10: 5
PAINLEVEL_OUTOF10: 3

## 2020-07-19 ASSESSMENT — PAIN DESCRIPTION - LOCATION: LOCATION: LEG

## 2020-07-19 ASSESSMENT — PAIN - FUNCTIONAL ASSESSMENT: PAIN_FUNCTIONAL_ASSESSMENT: PREVENTS OR INTERFERES SOME ACTIVE ACTIVITIES AND ADLS

## 2020-07-19 ASSESSMENT — PAIN DESCRIPTION - ONSET: ONSET: ON-GOING

## 2020-07-19 ASSESSMENT — PAIN DESCRIPTION - PROGRESSION: CLINICAL_PROGRESSION: NOT CHANGED

## 2020-07-19 ASSESSMENT — PAIN DESCRIPTION - FREQUENCY: FREQUENCY: CONTINUOUS

## 2020-07-19 ASSESSMENT — PAIN DESCRIPTION - ORIENTATION: ORIENTATION: LEFT

## 2020-07-19 ASSESSMENT — PAIN DESCRIPTION - PAIN TYPE: TYPE: SURGICAL PAIN

## 2020-07-19 NOTE — PROGRESS NOTES
OCCUPATIONAL THERAPY INITIAL EVALUATION      Date:2020  Patient Name: Sophie Mckeon  MRN: [id number]  : 1975  Room: [address] [place], [place] [address]A    Evaluating OT: INOCENCIO Lazar, OTR/L [id number]    AM-PAC Daily Activity Raw Score:   Recommended Adaptive Equipment: AE, shower chair     Diagnosis: L tibial fx   Referring Practitioner: Aby Linn DO   Surgery:  L bicondylar tibial plateau ORIF  Pertinent Medical History: none   Precautions:  Falls, NWB LLE (has an immobilizer( until he gets hinged brace)     Home Living: Pt lives with his mother in a 2 story home with 0 step(s) to enter; bed/bath on 2nd floor, but can have a 1st floor set-up   Bathroom setup: tub/shower unit on both levels   Equipment owned: none  Prior Level of Function: IND with ADLs/IADLs; using no AD for functional mobility   Driving: yes  Occupation: does not work    Pain Level: 7/10 LLE  Cognition: A&O: 4/4; Follows multi step directions    Memory:  good    Sequencing:  good    Problem solving:  fair    Judgement/safety:  fair      Functional Assessment:   Initial Eval Status  Date: 2020 Treatment Status  Date: Short Term Goals  Treatment frequency: 2-5x/wk   Feeding IND      Grooming CGA (standing at sink)  IND   UB Dressing SUP   IND   LB Dressing Max A (assist with B socks)  Min A    Bathing Min A (simulated)  SUP    Toileting Min A (assist with balance)  SUP   Bed Mobility  Log roll: SUP  Supine to sit: SUP   Sit to supine: Min A   Log roll IND  Supine to sit: IND   Sit to supine: IND   Functional Transfers Sit to stand:SBA   Stand to sit:SBA  Commode: CGA (RN notified to order [facility] for rm)  IND   Functional Mobility SBA (using ww, to/from bathroom)  IND   Balance Sitting: SUP  Standing: SBA     Activity Tolerance fair     Visual/  Perceptual Glasses: no                Hand dominance: R  UE ROM: RUE:  WFL  LUE:  WFL  Strength: RUE: grossly 5/5 LUE: grossly 5/5   Strength: B WFL  Fine Motor Coordination:  WFL     Hearing: WFL  Sensation:  No c/o numbness/tingling   Tone:  WFL  Edema: LLE                            Comments:Cleared by RN to see pt. Upon arrival, patient supine in bed and agreeable to OT session. At end of session, patient supine in bed with call light and phone within reach, all lines and tubes intact. Pt would benefit from continued OT to increase functional independence and quality of life. Treatment: Pt required vc's for proper technique/safety with hand placement/body mechanics/posture for bed mobility/ADLs/functional tranfers/mobility/ww management due to impulsivity. Pt able to  Sit on commode ~15 mins to increase core strength/balance/activity tolerance for ease with ADLs. Pt educated on NWB LLE. Pt required increased time to complete ADLs/functional transfers due to management of multiple lines and toileting task. Pt required rest breaks during session and educated on pursed lip breathing. Pt appeared to have tolerated session well and appears motivated/cooperative/pleasant . Pt instructed on use of call light for assistance and fall prevention. Pt demo'ing fair understanding of education provided. `Continue to educate.      Eval Complexity: mod    Assessment of current deficits   Functional mobility [x]  ADLs [x] Strength [x]  Cognition []  Functional transfers  [x] IADLs [x] Safety Awareness [x]  Endurance [x]  Fine Motor Coordination [] Balance [x] Vision/perception [] Sensation []   Gross Motor Coordination [] ROM [] Delirium []                  Motor Control []    Plan of Care:   ADL retraining [x]   Equipment needs [x]   Neuromuscular re-education [x] Energy Conservation Techniques [x]  Functional Transfer training [x] Patient and/or Family Education [x]  Functional Mobility training [x]  Environmental Modifications [x]  Cognitive re-training []   Compensatory techniques for ADLs [x]  Splinting Needs []   Positioning to improve overall function [x]   Therapeutic Activity [x]  Therapeutic Exercise [x]  Visual/Perceptual: []    Delirium prevention/treatment  []   Other:  []    Rehab Potential: Good for established goals, pt. assisted in establishment of goals. LTG: maximize independence with ADLs to return to PLOF    Patient instructed on diagnosis, prognosis/goals and plan of care. Demonstrated  understanding. [] Malnutrition indicators have been identified and nursing has been notified to ensure a dietitian consult is ordered. Evaluation time includes thorough review of current medical information, gathering information on past medical & social history & PLOF, completion of standardized testing, informal observation of tasks, consultation with other medical professions/disciplines, assessment of data & development of POC/goals.      mod Evaluation +     Treatment Time In: 10:10        Treatment Time Out: 10:30           Treatment Charges: Mins Units   Ther Ex  60132       Manual Therapy 10600       Thera Activities 62276       ADL/Home Mgt 32193 20 1   Neuro Re-ed 47354       Group Therapy        Orthotic manage/training  88169       Non-Billable Time       Total Timed Treatment 20 [address] [place], [place], OTR/L [id number]

## 2020-07-19 NOTE — PROGRESS NOTES
Physical Therapy  Physical Therapy Initial Assessment     Name: aNyla David  : 1975  MRN: [id number]    Referring Provider:  Baron Adams DO    Date of Service: 2020    Evaluating PT:  Enzo Ding, PT, DPT [id number]    Room #:  1858/6639-J  Diagnosis:  L tibial plateau fx  Precautions: Falls, NWB LLE, L knee immobilizer until pt gets hinged brace  Procedure/Surgery:   Open reduction internal fixation left bicondylar tibial plateau fracture  PMHx/PSHx:  NA  Equipment Needs:  TBD WW vs crutches    SUBJECTIVE:    Pt lives with mother, who is unable to assist, in a 2 story home, possible 1st floor setup with level entry. Pt ambulated with no device and was independent PTA. OBJECTIVE:   Initial Evaluation  Date: 20 Treatment Short Term/ Long Term   Goals   AM-PAC 6 Clicks      Was pt agreeable to Eval/treatment? Yes     Does pt have pain? No c/o pain at rest; L knee pain with mobility     Bed Mobility  Rolling: NT  Supine to sit: SBA  Sit to supine: Simin for LLE  Scooting: SBA  Mod Independent   Transfers Sit to stand: SBA  Stand to sit: SBA  Stand pivot: SBA with [employer]  Mod Independent with AAD   Ambulation   15 feet x 2 reps with SBA with [employer]  >150 feet with Mod Independent with AAD   Stair negotiation: ascended and descended NT  >4 steps with B crutches Mod Independent   ROM BUE:  Defer to OT note  BLE:  WNL except LLE     Strength BUE:  Defer to OT note  BLE:  4/5 except LLE  Increase by 1/3 MMT grade   Balance Sitting EOB:  SBA  Dynamic Standing:  SBA with [employer]  Sitting EOB:  Independent  Dynamic Standing:   Mod Independent with AAD     Pt is A & O x 4  Sensation:  L foot paresthesias   Edema:  None    Therapeutic Exercises:  NA    Patient education  Pt educated on safety    Patient response to education:   Pt verbalized understanding Pt demonstrated skill Pt requires further education in this area   x x x     ASSESSMENT:    Comments:  Pt was supine in bed upon arrival, agreeable to initial evaluation. Pt was educated on WB status prior to activity. Pt reported increased LLE pain with activity. Pt ambulated with mild unsteadiness but no LOB noted. Pt requested to use bathroom. Fatigued reported and pt was returned to bed per request with all needs met and call light in reach. Treatment:  Patient practiced and was instructed in the following treatment:     Bed mobility training - pt given verbal and tactile cues to facilitate proper sequencing and safety during supine>sit as well as provided with physical assistance to complete task    Sitting EOB for >5 minutes for upright tolerance, postural awareness and BLE ROM   Transfer training - pt was given verbal cues to facilitate proper hand placement, technique and safety during sit to stand and stand to sit to complete task.  Gait training- pt was given verbal cues to facilitate safety, balance and use of AD to complete task. Pt's/ family goals   1. Return home    Patient and or family understand(s) diagnosis, prognosis, and plan of care. Yes    PLAN:    PT care will be provided in accordance with the objectives noted above. Exercises and functional mobility practice will be used as well as appropriate assistive devices or modalities to obtain goals. Patient and family education will also be administered as needed. Frequency of treatments: 2-5x/week x 1-2 weeks. Time in  0845  Time out  0905    Total Treatment Time  15 minutes     Evaluation Time includes thorough review of current medical information, gathering information on past medical history/social history and prior level of function, completion of standardized testing/informal observation of tasks, assessment of data and education on plan of care and goals.     CPT codes:  [x] Low Complexity PT evaluation 89823  [] Moderate Complexity PT evaluation 25659  [] High Complexity PT evaluation 40564  [] PT Re-evaluation 42386  [] Gait training 44153 - minutes  [] Manual therapy 33090 - minutes  [x] Therapeutic activities 82141 15 minutes  [] Therapeutic exercises 14810 - minutes  [] Neuromuscular reeducation 81091 - minutes     Rupinder Paredesch, PT, DPT  [id number]

## 2020-07-19 NOTE — PROGRESS NOTES
Department of Orthopedic Surgery  Resident Progress Note    Patient seen and examined. Pain controlled. No new complaints. Denies chest pain, shortness of breath, dizziness/lightheadedness. Doing well POD#1.  - Flatulence, - BM     VITALS:  /70   Pulse 78   Temp 99.9 °F (37.7 °C) (Temporal)   Resp 18   Ht 5' 4\" (1.626 m)   Wt 140 lb (63.5 kg)   SpO2 97%   BMI 24.03 kg/m²     General: alert and oriented to person, place and time, well-developed and well-nourished, in no acute distress    MUSCULOSKELETAL:   left lower extremity:  · Dressing C/D/I, KI in place  · Compartments soft and compressible  · +PF/DF/EHL  · Brisk Cap refill, Toes warm and perfused  · Distal sensation grossly intact to Peroneals, Sural, Saphenous, and tibial nrs    CBC:   Lab Results   Component Value Date    WBC 9.7 07/18/2020    HGB 11.8 07/18/2020    HCT 37.7 07/18/2020     07/18/2020     PT/INR:    Lab Results   Component Value Date    PROTIME 11.2 07/17/2020    INR 1.0 07/17/2020       ASSESSMENT  · S/P L Tibial Plateau ORIF - 6/67/05    PLAN      · Continue physical therapy and protocol: NWB - LLE  · 24 hour abx coverage  · Deep venous thrombosis prophylaxis - ASA, early mobilization  · Hinged knee brace per [place] orthotics locked in extension  · PT/OT  · Pain Control: IV and PO  · Monitor H&H-11.8  · Doing well POD #1  · D/C Plan:  DC when medically stable and PT/OT progression.

## 2020-07-20 VITALS
DIASTOLIC BLOOD PRESSURE: 79 MMHG | BODY MASS INDEX: 23.9 KG/M2 | HEIGHT: 64 IN | TEMPERATURE: 98.4 F | HEART RATE: 108 BPM | RESPIRATION RATE: 18 BRPM | WEIGHT: 140 LBS | OXYGEN SATURATION: 98 % | SYSTOLIC BLOOD PRESSURE: 125 MMHG

## 2020-07-20 LAB
ANION GAP SERPL CALCULATED.3IONS-SCNC: 14 MMOL/L (ref 7–16)
BUN BLDV-MCNC: 4 MG/DL (ref 6–20)
CALCIUM SERPL-MCNC: 8.4 MG/DL (ref 8.6–10.2)
CHLORIDE BLD-SCNC: 102 MMOL/L (ref 98–107)
CO2: 22 MMOL/L (ref 22–29)
CREAT SERPL-MCNC: 0.8 MG/DL (ref 0.7–1.2)
GFR AFRICAN AMERICAN: >60
GFR NON-AFRICAN AMERICAN: >60 ML/MIN/1.73
GLUCOSE BLD-MCNC: 104 MG/DL (ref 74–99)
HCT VFR BLD CALC: 27.8 % (ref 37–54)
HEMOGLOBIN: 8.8 G/DL (ref 12.5–16.5)
MAGNESIUM: 1.8 MG/DL (ref 1.6–2.6)
MCH RBC QN AUTO: 29.5 PG (ref 26–35)
MCHC RBC AUTO-ENTMCNC: 31.7 % (ref 32–34.5)
MCV RBC AUTO: 93.3 FL (ref 80–99.9)
PDW BLD-RTO: 12.3 FL (ref 11.5–15)
PLATELET # BLD: 156 E9/L (ref 130–450)
PMV BLD AUTO: 10.3 FL (ref 7–12)
POTASSIUM REFLEX MAGNESIUM: 3.4 MMOL/L (ref 3.5–5)
RBC # BLD: 2.98 E12/L (ref 3.8–5.8)
SODIUM BLD-SCNC: 138 MMOL/L (ref 132–146)
WBC # BLD: 10.8 E9/L (ref 4.5–11.5)

## 2020-07-20 PROCEDURE — 80048 BASIC METABOLIC PNL TOTAL CA: CPT

## 2020-07-20 PROCEDURE — 2580000003 HC RX 258: Performed by: STUDENT IN AN ORGANIZED HEALTH CARE EDUCATION/TRAINING PROGRAM

## 2020-07-20 PROCEDURE — L1832 KO ADJ JNT POS R SUP PRE CST: HCPCS

## 2020-07-20 PROCEDURE — 97530 THERAPEUTIC ACTIVITIES: CPT

## 2020-07-20 PROCEDURE — 97535 SELF CARE MNGMENT TRAINING: CPT

## 2020-07-20 PROCEDURE — 97530 THERAPEUTIC ACTIVITIES: CPT | Performed by: PHYSICAL THERAPIST

## 2020-07-20 PROCEDURE — 6370000000 HC RX 637 (ALT 250 FOR IP): Performed by: FAMILY MEDICINE

## 2020-07-20 PROCEDURE — 83735 ASSAY OF MAGNESIUM: CPT

## 2020-07-20 PROCEDURE — 85027 COMPLETE CBC AUTOMATED: CPT

## 2020-07-20 PROCEDURE — G0378 HOSPITAL OBSERVATION PER HR: HCPCS

## 2020-07-20 PROCEDURE — 6370000000 HC RX 637 (ALT 250 FOR IP): Performed by: STUDENT IN AN ORGANIZED HEALTH CARE EDUCATION/TRAINING PROGRAM

## 2020-07-20 PROCEDURE — 36415 COLL VENOUS BLD VENIPUNCTURE: CPT

## 2020-07-20 RX ADMIN — Medication 10 ML: at 08:48

## 2020-07-20 RX ADMIN — ASPIRIN 325 MG: 325 TABLET, COATED ORAL at 08:47

## 2020-07-20 RX ADMIN — IBUPROFEN 400 MG: 400 TABLET, FILM COATED ORAL at 17:51

## 2020-07-20 RX ADMIN — IBUPROFEN 400 MG: 400 TABLET, FILM COATED ORAL at 03:48

## 2020-07-20 ASSESSMENT — PAIN SCALES - GENERAL
PAINLEVEL_OUTOF10: 7
PAINLEVEL_OUTOF10: 2
PAINLEVEL_OUTOF10: 3
PAINLEVEL_OUTOF10: 5
PAINLEVEL_OUTOF10: 0

## 2020-07-20 ASSESSMENT — PAIN DESCRIPTION - DESCRIPTORS: DESCRIPTORS: NUMBNESS;TINGLING

## 2020-07-20 ASSESSMENT — PAIN DESCRIPTION - ONSET
ONSET: ON-GOING
ONSET: ON-GOING

## 2020-07-20 ASSESSMENT — PAIN DESCRIPTION - PAIN TYPE
TYPE: SURGICAL PAIN
TYPE: ACUTE PAIN;SURGICAL PAIN

## 2020-07-20 ASSESSMENT — PAIN DESCRIPTION - FREQUENCY
FREQUENCY: CONTINUOUS
FREQUENCY: CONTINUOUS

## 2020-07-20 ASSESSMENT — PAIN DESCRIPTION - ORIENTATION
ORIENTATION: LEFT
ORIENTATION: LEFT

## 2020-07-20 ASSESSMENT — PAIN DESCRIPTION - LOCATION
LOCATION: LEG
LOCATION: LEG

## 2020-07-20 ASSESSMENT — PAIN - FUNCTIONAL ASSESSMENT: PAIN_FUNCTIONAL_ASSESSMENT: PREVENTS OR INTERFERES SOME ACTIVE ACTIVITIES AND ADLS

## 2020-07-20 ASSESSMENT — PAIN DESCRIPTION - PROGRESSION: CLINICAL_PROGRESSION: GRADUALLY IMPROVING

## 2020-07-20 NOTE — PROGRESS NOTES
x 3  Sensation:  Pt denies numbness and tingling to extremities  Edema:  unremarkable    Patient education  Pt educated on NWB on L LE, gait sequencing, safety with mobility, transfer technique    Patient response to education:   Pt verbalized understanding Pt demonstrated skill Pt requires further education in this area   yes yes yes     ASSESSMENT:    Comments:  Pt resting semi-supine upon arrival, agreeable to PT session with encouragement. Pt completed bed mobility and transfer without external assistance, requiring only verbal cues for improved technique. Pt requires cues to maintain L LE NWB throughout ambulation as pt allowing L foot to increased to TTWB with fatigue but was able to resume NWB with vc's. Pt required seated rest break between ambulation distances but was agreeable to attempt second round of ambulation. He demonstrates slow gait speed with light steadying assist required for mobility. PT encouraged pt to have family member bring R shoe to assist with maintaining L LE NWB. Pt seated in chair at bedside with all needs in reach and L LE elevated on small stool with pillow. Treatment:  Patient practiced and was instructed in the following treatment:     Bed mobility: verbal cues for safe technique, demonstrates use of UEs to assist with L LE management   Transfer training: verbal cues for improved hand placement and sequencing for technique   Gait training: cues for improved sequencing and to ensure maintenance of L LE NWB, cues for improved walker management, hands on assist for balance    PLAN:    Patient is making good progress towards established goals. Will continue with current POC.         PLAN:    Time in  1340  Time out  1358    Total Treatment Time  18    CPT codes:  [] Gait training 95888 0 minutes  [] Manual therapy 16367 0 minutes  [x] Therapeutic activities 36750 18 minutes  [] Therapeutic exercises 65526 0 minutes  [] Neuromuscular reeducation 30498 0 minutes      Yasmine Moore PT, DPT  [id number]

## 2020-07-20 NOTE — PROGRESS NOTES
Department of Orthopedic Surgery  Resident Progress Note    Patient seen and examined. Pain controlled. No new complaints. Denies chest pain, shortness of breath, dizziness/lightheadedness. Doing well ambulated 30ft with PT on 7/19. VITALS:  BP (!) 125/93   Pulse 107   Temp 100 °F (37.8 °C) (Temporal)   Resp 16   Ht 5' 4\" (1.626 m)   Wt 140 lb (63.5 kg)   SpO2 98%   BMI 24.03 kg/m²     General: alert and oriented to person, place and time, well-developed and well-nourished, in no acute distress    MUSCULOSKELETAL:   left lower extremity:  · Dressing C/D/I, ROM brace present  · Compartments soft and compressible  · +PF/DF/EHL  · Brisk Cap refill, Toes warm and perfused  · Distal sensation grossly intact to Peroneals, Sural, Saphenous, and tibial nrs    CBC:   Lab Results   Component Value Date    WBC 9.7 07/18/2020    HGB 11.8 07/18/2020    HCT 37.7 07/18/2020     07/18/2020     PT/INR:    Lab Results   Component Value Date    PROTIME 11.2 07/17/2020    INR 1.0 07/17/2020       ASSESSMENT  · S/P L Tibial Plateau ORIF - 5/32/73    PLAN      · Continue physical therapy and protocol: NWB - LLE  · 24 hour abx coverage- completed   · Deep venous thrombosis prophylaxis - ASA, early mobilization  · Hinged knee brace per Edwige Field- on  · PT/OT  · Pain Control: IV and PO- wean toorals  · Monitor H&H  · Ortho to follow peripherally at this point. Ok to d/c once medically ready.  Please contact with any questions or concerns

## 2020-07-20 NOTE — PROGRESS NOTES
Occupational Therapy  OT BEDSIDE TREATMENT NOTE      Date:2020  Patient Name: Bárbara Crowe  MRN: [id number]  : 1975  Room: [address] [place] [address]A     Evaluating OT: INOCENCIO Bryan, OTR/L [id number]     AM-PAC Daily Activity Raw Score:   Recommended Adaptive Equipment: AE (issued ), shower chair      Diagnosis: L tibial fx   Referring Practitioner: Princess Kelvin DO   Surgery:  L bicondylar tibial plateau ORIF  Pertinent Medical History: none   Precautions:  Falls, NWB LLE (has an immobilizer( until he gets hinged brace)     Home Living: Pt lives with his mother in a 2 story home with 0 step(s) to enter; bed/bath on 2nd floor, but can have a 1st floor set-up   Bathroom setup: tub/shower unit on both levels   Equipment owned: none  Prior Level of Function: IND with ADLs/IADLs; using no AD for functional mobility   Driving: yes  Occupation: does not work     Pain Level: Pt reports 3/10 pain, educated on positioning.   Cognition: A&O: 4/4; Follows multi step directions               Memory:  good               Sequencing:  good               Problem solving:  fair+               Judgement/safety:  fair+                 Functional Assessment:    Initial Eval Status  Date: 2020 Treatment Status  Date: 2020 Short Term Goals  Treatment frequency: 2-5x/wk   Feeding IND   Ind     Grooming CGA (standing at sink) SBA  Standing at sink  IND   UB Dressing SUP   Set up  To don/doff gown while seated upright in bed IND   LB Dressing Max A (assist with B socks)  Mod A  Pt educated on LB AE, donned/doffed socks using reacher and sock aid seated in bedside chair Min A    Bathing Min A (simulated) Min A  simulated  SUP    Toileting Min A (assist with balance) SBA  SBA for hygiene and clothing management  SUP   Bed Mobility  Log roll: SUP  Supine to sit: SUP   Sit to supine: Min A  SBA- supine to sit  Educated pt on technique to increase independence.    Log roll IND  Supine to sit: IND   Sit to supine: IND Functional Transfers Sit to stand:SBA   Stand to sit:SBA  Commode: CGA (RN notified to order [facility] for rm) SBA- sit<->stand  Cuing for hand placement and body mechanics  IND   Functional Mobility SBA (using ww, to/from bathroom) SBA  To and from bathroom using w/w, demonstrates good understanding of NWB precautions  IND   Balance Sitting: SUP  Standing: SBA Sitting: SUP  Standing: SBA      Activity Tolerance fair Fair      Visual/  Perceptual Glasses: no                        Comments: Upon arrival pt supine in bed. Pt educated on techniques to increase independence and safety during ADL's, bed mobility, and functional transfers. Discussed home set up with pt, giving suggestions to increase safety at discharge. At end of session pt left seated in bedside chair, call light within reach. · Pt has made fair progress towards set goals.      · Continue with current plan of care    Treatment Time In: 9:00            Treatment Time Out: 9:30             Treatment Charges: Mins Units   Ther Ex  93870     Manual Therapy 25440     Thera Activities 48224 15 1   ADL/Home Mgt 82321 15 1   Neuro Re-ed 09077     Group Therapy      Orthotic manage/training  14645     Non-Billable Time     Total Timed Treatment 30 [id number] [place] [address]

## 2020-07-20 NOTE — PROGRESS NOTES
Dr Betty Nesbitt perfect served that Mr Erika Martinez can discharge if read, medicine has signed off. Dr Betty Nesbitt is in surgery at this time.

## 2020-07-20 NOTE — PLAN OF CARE
Problem: Falls - Risk of:  Goal: Will remain free from falls  Description: Will remain free from falls  Outcome: Met This Shift     Problem: Falls - Risk of:  Goal: Absence of physical injury  Description: Absence of physical injury  Outcome: Met This Shift     Problem: Pain:  Goal: Pain level will decrease  Description: Pain level will decrease  Outcome: Met This Shift     Problem: Pain:  Goal: Control of acute pain  Description: Control of acute pain  Outcome: Met This Shift

## 2020-07-20 NOTE — PROGRESS NOTES
Patient needs DME wheeled walker due to his previous fracture. A walker is the least amount needed assistance to help him attain his activities of daily living.     Electronically signed by Elizabeth Clay DO on 7/20/20 at 7:02 PM EDT

## 2020-07-21 NOTE — PROGRESS NOTES
Spoke to Dr. Carmen Benitez, stated pt was ok to d/c as long as his HR was under 120. Latest . Pt denies any distress, looks comfortable resting in bed. HR seems to be regular. Pt c/o a little pain, no other issues. Iv removed. D/c papers being printed. Pt encouraged to get a PCP if he doesn't have one and follow up soon.    Electronically signed by Diamond Jacobson RN on 7/20/2020 at 9:14 PM    Denies questions

## 2020-07-23 NOTE — DISCHARGE SUMMARY
Physician Discharge Summary    Patient ID:  Nereida Carlson  [id number]  75 y.o.  1975    Admit date: 7/17/2020    Discharge date and time: 7/20/2020  9:40 PM     Admitting Physician: Bimal Butler MD     Discharge Physician: Bimal Butler MD    Admission Diagnoses: Closed fracture of left tibial plateau, initial encounter [S82.142A]  Closed fracture of left tibial plateau, initial encounter [S82.142A]    Discharge Diagnoses: s/p Open reduction internal fixation left bicondylar tibial plateau fracture    Admission Condition: good    Discharged Condition: good    Hospital Course: The patient was admitted on 7/17/2020. The patient underwent an uneventful course of Open reduction internal fixation left bicondylar tibial plateau fracture by Bimal Butler MD on 7/18/20. The patient was subsequently taken to the PACU and the floor in stable condition. The patient was started on pain control, DVT prophylaxis, antibiotics, and physical therapy as indicated. Blood counts were followed as needed. Discharge planning was performed and tailored in regards to patient progress, therapy progress, home needs, and support. Once the patient had made appropriate gains, they were able to be discharged    Treatments: s/p Open reduction internal fixation left bicondylar tibial plateau fracture    Disposition: Home. The patient was provided instructions to continue antibiotics, pain medication, DVT prophylaxis, Dressing changes as applicable. The patient was instructed on restrictions and activities. The patient was to follow up with Bimal Butler MD, and to call the office for an appointment. Medication List      START taking these medications    aspirin 325 MG EC tablet  Take 1 tablet by mouth 2 times daily for 28 days     oxyCODONE 5 MG immediate release tablet  Commonly known as:  Roxicodone  Take 1 tablet by mouth every 6 hours as needed for Pain for up to 7 days.  Intended supply: 7 days.  Take lowest dose possible to manage pain           Where to Get Your Medications      You can get these medications from any pharmacy    Bring a paper prescription for each of these medications  · aspirin 325 MG EC tablet  · oxyCODONE 5 MG immediate release tablet         Signed:  Alexa Holland  7/23/2020  12:55 PM

## 2020-07-31 ENCOUNTER — HOSPITAL ENCOUNTER (OUTPATIENT)
Dept: GENERAL RADIOLOGY | Age: 45
Discharge: HOME | End: 2020-08-02
Payer: MEDICAID

## 2020-07-31 ENCOUNTER — HOSPITAL ENCOUNTER (OUTPATIENT)
Dept: ULTRASOUND IMAGING | Age: 45
Discharge: HOME | End: 2020-08-02
Payer: MEDICAID

## 2020-07-31 ENCOUNTER — OFFICE VISIT (OUTPATIENT)
Dept: ORTHOPEDIC SURGERY | Age: 45
End: 2020-07-31
Payer: MEDICAID

## 2020-07-31 VITALS
HEART RATE: 100 BPM | WEIGHT: 140 LBS | SYSTOLIC BLOOD PRESSURE: 120 MMHG | TEMPERATURE: 99 F | HEIGHT: 72 IN | BODY MASS INDEX: 18.96 KG/M2 | DIASTOLIC BLOOD PRESSURE: 73 MMHG

## 2020-07-31 PROCEDURE — 73590 X-RAY EXAM OF LOWER LEG: CPT

## 2020-07-31 PROCEDURE — 73560 X-RAY EXAM OF KNEE 1 OR 2: CPT

## 2020-07-31 PROCEDURE — 93971 EXTREMITY STUDY: CPT

## 2020-07-31 PROCEDURE — 99024 POSTOP FOLLOW-UP VISIT: CPT | Performed by: NURSE PRACTITIONER

## 2020-07-31 RX ORDER — OXYCODONE HYDROCHLORIDE 5 MG/1
5 CAPSULE ORAL EVERY 6 HOURS PRN
COMMUNITY
End: 2020-08-13 | Stop reason: ALTCHOICE

## 2020-07-31 RX ORDER — IBUPROFEN 200 MG
200 TABLET ORAL EVERY 6 HOURS PRN
COMMUNITY

## 2020-07-31 RX ORDER — METHOCARBAMOL 750 MG/1
750 TABLET, FILM COATED ORAL 3 TIMES DAILY
Qty: 90 TABLET | Refills: 0 | Status: SHIPPED | OUTPATIENT
Start: 2020-07-31 | End: 2020-08-30

## 2020-07-31 RX ORDER — OXYCODONE HYDROCHLORIDE AND ACETAMINOPHEN 5; 325 MG/1; MG/1
1 TABLET ORAL EVERY 6 HOURS PRN
Qty: 28 TABLET | Refills: 0 | Status: SHIPPED
Start: 2020-07-31 | End: 2020-08-13 | Stop reason: SDUPTHER

## 2020-07-31 RX ORDER — PHENOL 1.4 %
1 AEROSOL, SPRAY (ML) MUCOUS MEMBRANE 2 TIMES DAILY
Qty: 60 TABLET | Refills: 3 | Status: SHIPPED | OUTPATIENT
Start: 2020-07-31

## 2020-07-31 RX ORDER — ERGOCALCIFEROL 1.25 MG/1
50000 CAPSULE ORAL WEEKLY
Qty: 4 CAPSULE | Refills: 5 | Status: SHIPPED | OUTPATIENT
Start: 2020-07-31

## 2020-07-31 RX ORDER — ASPIRIN 325 MG
325 TABLET, DELAYED RELEASE (ENTERIC COATED) ORAL 2 TIMES DAILY
Qty: 60 TABLET | Refills: 3 | Status: SHIPPED | OUTPATIENT
Start: 2020-07-31 | End: 2020-08-30

## 2020-07-31 NOTE — PATIENT INSTRUCTIONS
Strict nonweightbearing left lower extremity. Sutures were removed today, Steri-Strips were applied. Steri-Strips should fall off on their own and they do not fall off after 10 days okay to remove on your own  Prescription for shower chair  Order written to start physical therapy at [address] [facility]. We will follow the tibial plateau protocol at the 2-week tessa. Brace will be advanced 10 degrees once a week. Absolutely no straight leg raises or resistance exercises. Aspirin 325 mg twice daily for DVT prophylaxis. Robaxin 750 mg 3 times daily. Start calcium 500 mg twice daily. Start vitamin D 50,000 units once a week. Strongly advised to stop smoking altogether to help with fracture healing. We will order a STAT ultrasound of the left lower extremity to rule out DVT since she ran out of aspirin and were having some chest pain over the last couple days. Refill of Percocet sent to pharmacy. All prescriptions were sent to [facility]. Follow-up in 4 weeks.   Call sooner with any problems or concerns

## 2020-07-31 NOTE — PROGRESS NOTES
OP:Date of Procedure: 7/18/2020  Procedure(s): Open reduction internal fixation left bicondylar tibial plateau fracture   Surgeon(s): Leslye Ormond, MD    Subjective: Mariam Lorenzo is approximately 2 weeks follow-up from the above surgery. Patient is NWB on that extremity. He ambulates with assistive device, walker or wheelchair. He is accompanied by his mother today. Pain to extremity is mild and moderate and is  taking pain medication, Oxycodone 5 mg every 6 hours with relief. He is requesting a refill today. Patient admits that he did run out of the Aspirin for a couple days. He has also had a couple episodes of chest pain that lasted for 10 to 15 minutes and then subsided. He denies any SOB. Patient is a daily smoker. Advised to stop to help with bone healing. He complains of some intermittent muscle spasms which occasionally will awaken him a night. Denies any numbness or tingling. Patient is not participating in any formal therapy yet. Denies any other orthopedic problems or concerns at this time. Review of Systems -    General ROS: negative for - chills, fatigue, fever or night sweats  Respiratory ROS: no cough, shortness of breath, or wheezing  Cardiovascular ROS: no chest pain or dyspnea on exertion  Gastrointestinal ROS: no abdominal pain, nausea, vomiting, diarrhea, constipation,or black or bloody stools  Genitourinary: no hematuria, dysuria, or incontinence   Musculoskeletal ROS: negative for -back or neck pain or stiffness, also see HPI  Neurological ROS: no TIA or stroke symptoms     Objective:    General: Alert and oriented X 3, normocephalic atraumatic, external ears and eye normal, sclera clear, no acute distress, respirations easy and unlabored with no audible wheezes, skin warm and dry, speech and dress appropriate for noted age, affect euthymic.     Extremity:  Left Lower Extremity  Skin clean dry and intact, without signs of infection  Incisions well approximated without signs of redness, warmth or drainage- sutures intact and ready for removal  Moderate edema and firmness noted to the left calf with mild tenderness  Compartments supple throughout thigh. Hinged ROM brace set at 0 to 30 degrees  Demonstrates active, ankle plantar/dorsiflexion/great toe extension. States sensation intact to touch in sural/deep peroneal/superficial peroneal/saphenous/posterior tibial nerve distributions to foot/ankle. Palpable dorsalis pedis and posterior tibialis pulses, cap refill brisk in toes, foot warm/perfused. /73 (Site: Right Upper Arm)   Pulse 100   Temp 99 °F (37.2 °C)   Ht 6' (1.829 m)   Wt 140 lb (63.5 kg)   BMI 18.99 kg/m²     XR: X-rays of the left tibia/fibula and knee demonstrating a comminuted left tibial plateau fracture with no change in alignment. Hardware fixation is present with no signs of failure or lucency. No other osseous abnormalities. Assessment:   Diagnosis Orders   1. Closed fracture of left tibial plateau, initial encounter  oxyCODONE-acetaminophen (PERCOCET) 5-325 MG per tablet    Ambulatory referral to Physical Therapy       Plan:  Strict nonweightbearing left lower extremity. Sutures were removed today, Steri-Strips were applied. Steri-Strips should fall off on their own and they do not fall off after 10 days okay to remove on your own  Prescription for shower chair  Order written to start physical therapy at [facility]. We will follow the tibial plateau protocol at the 2-week tessa. Brace will be advanced 10 degrees once a week. Absolutely no straight leg raises or resistance exercises. Aspirin 325 mg twice daily for DVT prophylaxis. Robaxin 750 mg 3 times daily. Start calcium 500 mg twice daily. Start vitamin D 50,000 units once a week. Strongly advised to stop smoking altogether to help with fracture healing.   We will order a STAT ultrasound of the left lower extremity to rule out DVT since she ran out of aspirin and were having some chest pain over the last couple days. Refill of Percocet sent to pharmacy. All prescriptions were sent to [facility]. Follow-up in 4 weeks. Call sooner with any problems or concerns    Electronically signed by CHEYENNE Ortiz CNP on 7/31/2020 at 12:33 PM    Note: This report was completed using computerize voiced recognition Antoni Martel effort has been made to ensure accuracy; however, inadvertent computerized transcription errors may be present.

## 2020-08-13 RX ORDER — OXYCODONE HYDROCHLORIDE AND ACETAMINOPHEN 5; 325 MG/1; MG/1
1 TABLET ORAL EVERY 8 HOURS PRN
Qty: 21 TABLET | Refills: 0 | Status: SHIPPED
Start: 2020-08-13 | End: 2020-08-28 | Stop reason: SDUPTHER

## 2020-08-13 NOTE — TELEPHONE ENCOUNTER
Patient's mother left a voice message requesting refill on Percocet     Date of Procedure: 7/18/2020  Procedure(s): Open reduction internal fixation left bicondylar tibial plateau fracture   Surgeon(s): Nicholas Dickerson MD    Order pended and routed for decision and signature. Pharmacy: [facility]    Last refill sent to pharmacy on 07/31/20.

## 2020-08-13 NOTE — TELEPHONE ENCOUNTER
Evelyne Gonzalez is 3.5 weeks from the following Surgery:    Date of Procedure: 7/18/2020  Procedure(s): Open reduction internal fixation left bicondylar tibial plateau fracture     OARRS report reviewed  Last RX filled on 07/31/2020  Plan for wean: weaned to every 8 hours with this RX    Controlled Substance Monitoring:    Acute and Chronic Pain Monitoring:   RX Monitoring 8/13/2020   Periodic Controlled Substance Monitoring No signs of potential drug abuse or diversion identified.         Electronically signed by Glori Romberg, PA-C on 8/13/2020 at 2:14 PM

## 2020-08-28 RX ORDER — OXYCODONE HYDROCHLORIDE AND ACETAMINOPHEN 5; 325 MG/1; MG/1
1 TABLET ORAL EVERY 12 HOURS PRN
Qty: 14 TABLET | Refills: 0 | Status: SHIPPED | OUTPATIENT
Start: 2020-08-28 | End: 2020-09-04

## 2020-08-28 NOTE — TELEPHONE ENCOUNTER
Bárbara Crowe is 6 weeks from the following Surgery:    Date of Procedure: 7/18/2020  Procedure(s): Open reduction internal fixation left bicondylar tibial plateau fracture     OARRS report reviewed  Last RX filled on 08/13/2020  Plan for wean: weaned to every 12 hours with this RX, wean to daily then last RX    Controlled Substance Monitoring:    Acute and Chronic Pain Monitoring:   RX Monitoring 8/28/2020   Periodic Controlled Substance Monitoring No signs of potential drug abuse or diversion identified.         Electronically signed by Ty uGy PA-C on 8/28/2020 at 3:33 PM

## 2020-08-28 NOTE — TELEPHONE ENCOUNTER
Pt's mother called and left VM requesting refill of Percocet. OP:Date of Procedure: 7/18/2020  Procedure(s): Open reduction internal fixation left bicondylar tibial plateau fracture     Order pended and routed for decision and signature.     Verified [facility] on [address]

## 2020-08-31 ENCOUNTER — HOSPITAL ENCOUNTER (OUTPATIENT)
Dept: GENERAL RADIOLOGY | Age: 45
Discharge: HOME | End: 2020-09-02
Payer: MEDICAID

## 2020-08-31 ENCOUNTER — OFFICE VISIT (OUTPATIENT)
Dept: ORTHOPEDIC SURGERY | Age: 45
End: 2020-08-31
Payer: MEDICAID

## 2020-08-31 VITALS
WEIGHT: 150 LBS | HEART RATE: 99 BPM | SYSTOLIC BLOOD PRESSURE: 108 MMHG | DIASTOLIC BLOOD PRESSURE: 80 MMHG | HEIGHT: 73 IN | BODY MASS INDEX: 19.88 KG/M2 | TEMPERATURE: 98.1 F

## 2020-08-31 PROCEDURE — 73560 X-RAY EXAM OF KNEE 1 OR 2: CPT

## 2020-08-31 PROCEDURE — 99212 OFFICE O/P EST SF 10 MIN: CPT

## 2020-08-31 PROCEDURE — 73590 X-RAY EXAM OF LOWER LEG: CPT

## 2020-08-31 PROCEDURE — 99024 POSTOP FOLLOW-UP VISIT: CPT | Performed by: NURSE PRACTITIONER

## 2020-08-31 RX ORDER — METHOCARBAMOL 750 MG/1
750 TABLET, FILM COATED ORAL 3 TIMES DAILY
Qty: 30 TABLET | Refills: 0 | Status: SHIPPED | OUTPATIENT
Start: 2020-08-31 | End: 2020-09-10

## 2020-08-31 RX ORDER — IBUPROFEN 800 MG/1
800 TABLET ORAL
Qty: 90 TABLET | Refills: 1 | Status: SHIPPED | OUTPATIENT
Start: 2020-08-31

## 2020-08-31 RX ORDER — ASPIRIN 325 MG
325 TABLET, DELAYED RELEASE (ENTERIC COATED) ORAL 2 TIMES DAILY
Qty: 60 TABLET | Refills: 3 | Status: SHIPPED | OUTPATIENT
Start: 2020-08-31

## 2020-08-31 NOTE — PATIENT INSTRUCTIONS
Continue nonweightbearing to the left lower extremity. Hinged ROM brace was advanced to 70 degrees. Patient did not start formal outpatient therapy. Brace was set still at 0 to 30 degrees when he came into the office today. Continue aspirin 325 twice daily. Refill of Robaxin sent to the pharmacy. Percocet was already sent over at every 12 hours on 8-28-20, was not picked up yet. Continue calcium and vitamin D supplementation. New order for therapy sent to [facility]. Again strongly advised to stop all nicotine use as this can slow bone healing. Follow-up in 6 weeks. Call sooner with any problems or concerns.

## 2020-08-31 NOTE — PROGRESS NOTES
intact to touch in sural/deep peroneal/superficial peroneal/saphenous/posterior tibial nerve distributions to foot/ankle. Palpable dorsalis pedis and posterior tibialis pulses, cap refill brisk in toes, foot warm/perfused. /80   Pulse 99   Temp 98.1 °F (36.7 °C)   Ht 6' 1\" (1.854 m)   Wt 150 lb (68 kg)   BMI 19.79 kg/m²     XR: X-rays of the left knee and left tibia/fibula demonstrating a left tibial plateau fracture with no change in alignment. Hardware fixation present with no signs of failure or lucency. No acute fractures or dislocations. No other osseous abnormalities. Assessment:   Diagnosis Orders   1. Closed fracture of left tibial plateau, initial encounter  aspirin 325 MG EC tablet    ibuprofen (ADVIL;MOTRIN) 800 MG tablet    methocarbamol (ROBAXIN-750) 750 MG tablet    Ambulatory referral to [address] [place] [address] Drive:   Continue nonweightbearing to the left lower extremity. Hinged ROM brace was advanced to 70 degrees. Patient did not start formal outpatient therapy. Brace was set still at 0 to 30 degrees when he came into the office today. Continue aspirin 325 twice daily. Refill of Robaxin sent to the pharmacy. Percocet was already sent over at every 12 hours on 8-28-20, was not picked up yet. Continue calcium and vitamin D supplementation. New order for therapy sent to [facility]. Again strongly advised to stop all nicotine use as this can slow bone healing. Follow-up in 6 weeks. Call sooner with any problems or concerns. Electronically signed by CEHYENNE Patel CNP on 8/31/2020 at 3:11 PM    Note: This report was completed using computerCloudnexa voiced recognition software.  Every effort has been made to ensure accuracy; however, inadvertent computerized transcription errors may be present.

## 2020-10-19 ENCOUNTER — OFFICE VISIT (OUTPATIENT)
Dept: ORTHOPEDIC SURGERY | Age: 45
End: 2020-10-19
Payer: MEDICAID

## 2020-10-19 ENCOUNTER — HOSPITAL ENCOUNTER (OUTPATIENT)
Dept: GENERAL RADIOLOGY | Age: 45
Discharge: HOME | End: 2020-10-21
Payer: MEDICAID

## 2020-10-19 VITALS — DIASTOLIC BLOOD PRESSURE: 78 MMHG | TEMPERATURE: 99 F | HEART RATE: 68 BPM | SYSTOLIC BLOOD PRESSURE: 110 MMHG

## 2020-10-19 PROCEDURE — 73590 X-RAY EXAM OF LOWER LEG: CPT

## 2020-10-19 PROCEDURE — 99024 POSTOP FOLLOW-UP VISIT: CPT | Performed by: NURSE PRACTITIONER

## 2020-10-19 PROCEDURE — 99212 OFFICE O/P EST SF 10 MIN: CPT | Performed by: NURSE PRACTITIONER

## 2020-10-19 PROCEDURE — 73560 X-RAY EXAM OF KNEE 1 OR 2: CPT

## 2020-10-19 NOTE — PATIENT INSTRUCTIONS
Continue nonweightbearing until the CT results are back. Prescription for bone stimulator to the left tibial plateau fracture site. Exogen for bone stimulator. CT of the left knee with no contrast ordered today. Over-the-counter analgesics if needed for pain control. Ice as needed for pain or swelling control. Prescription for crutches per patient request  Continue calcium and vitamin D supplementation. Again strongly advised to stop smoking altogether. Follow-up in 2 weeks. Call sooner with any problems or concerns.

## 2020-10-19 NOTE — PROGRESS NOTES
OP::Date of Procedure: 7/18/2020  Procedure(s): Open reduction internal fixation left bicondylar tibial plateau fracture   Surgeon(s): Federico Ortiz MD    Subjective: Angelo Hughes is approximately 3 months follow-up from the above surgery. Patient is NWB on that extremity. He ambulates with assistive device, crutches or a wheelchair. Pain to extremity is mild and is  taking pain medication, Ibuprofen or Tylenol. They denies numbness, tingling, weakness. Denies Calf pain. Patient continues to use DVT prophylaxis with Aspirin twice daily. Patient is not participating in therapy. He does not want to do any formal therapy at this time. He states because of how sensitive his leg is, he does not think he would be able to tolerate it. He continues to be a daily smoker. He does still take his Calcium and Vitamin D supplementation. He denies any recent falls or injuries. He denies any other orthopedic problems or concerns at this time. Review of Systems -    General ROS: negative for - chills, fatigue, fever or night sweats  Respiratory ROS: no cough, shortness of breath, or wheezing  Cardiovascular ROS: no chest pain or dyspnea on exertion  Gastrointestinal ROS: no abdominal pain, nausea, vomiting, diarrhea, constipation,or black or bloody stools  Genitourinary: no hematuria, dysuria, or incontinence   Musculoskeletal ROS: negative for -back or neck pain or stiffness, also see HPI  Neurological ROS: no TIA or stroke symptoms     Objective:    General: Alert and oriented X 3, normocephalic atraumatic, external ears and eye normal, sclera clear, no acute distress, respirations easy and unlabored with no audible wheezes, skin warm and dry, speech and dress appropriate for noted age, affect euthymic.     Extremity:  Left Lower Extremity  Skin clean dry and intact, without signs of infection  Incisions well approximated without signs of redness, warmth or drainage  No edema noted, no effusion present  Mild tenderness on palpation over the medial left knee  Compartments supple throughout thigh and leg,   Calf supple and nontender to palaption  Demonstrates active knee flexion/extension AROM 0 to 100 degrees, ankle plantar/dorsiflexion/great toe extension. States sensation intact to touch in sural/deep peroneal/superficial peroneal/saphenous/posterior tibial nerve distributions to foot/ankle. Palpable dorsalis pedis and posterior tibialis pulses, cap refill brisk in toes, foot warm/perfused. /78 (Site: Right Upper Arm, Position: Sitting)   Pulse 68   Temp 99 °F (37.2 °C) (Oral)     XR: X-rays of the left knee and left tibia and fibula demonstrating a left tibial plateau fracture with no change in alignment. No interval healing appreciated. No acute fractures or dislocations. No other osseous abnormalities. Assessment:   Diagnosis Orders   1. Closed fracture of left tibial plateau with routine healing, subsequent encounter  XR KNEE LEFT (1-2 VIEWS)    XR TIBIA FIBULA LEFT (2 VIEWS)       Plan:  Continue nonweightbearing until the CT results are back. Prescription for bone stimulator to the left tibial plateau fracture site. Exogen for bone stimulator. CT of the left knee with no contrast ordered today. Over-the-counter analgesics if needed for pain control. Ice as needed for pain or swelling control. Prescription for crutches per patient request  Continue calcium and vitamin D supplementation. Again strongly advised to stop smoking altogether. Follow-up in 2 weeks. Call sooner with any problems or concerns. Electronically signed by CHEYENNE Merritt CNP on 10/19/2020 at 4:07 PM    Note: This report was completed using computerize voiced recognition Antoni 86 effort has been made to ensure accuracy; however, inadvertent computerized transcription errors may be present.

## 2020-11-12 ENCOUNTER — HOSPITAL ENCOUNTER (OUTPATIENT)
Dept: CT IMAGING | Age: 45
Discharge: HOME | End: 2020-11-14
Payer: MEDICAID

## 2020-11-12 PROCEDURE — 73700 CT LOWER EXTREMITY W/O DYE: CPT

## (undated) DEVICE — DRESSING,GAUZE,XEROFORM,CURAD,5"X9",ST: Brand: CURAD

## (undated) DEVICE — SOLUTION IV IRRIG POUR BRL 0.9% SODIUM CHL 2F7124

## (undated) DEVICE — GLOVE ORANGE PI 8   MSG9080

## (undated) DEVICE — Z INACTIVE USE 2660664 SOLUTION IRRIG 3000ML 0.9% SOD CHL USP UROMATIC PLAS CONT

## (undated) DEVICE — SET ORTHO STD STORTSTD2

## (undated) DEVICE — BIT DRL L180MM DIA4.3MM QUIK CPL W/O STP REUSE

## (undated) DEVICE — PADDING CAST W6INXL4YD COT LO LINTING WYTEX

## (undated) DEVICE — GLOVE SURG SZ 8 L12IN FNGR THK79MIL GRN LTX FREE

## (undated) DEVICE — SOLUTION IV IRRIG WATER 1000ML POUR BRL 2F7114

## (undated) DEVICE — TOTAL KNEE PK

## (undated) DEVICE — BANDAGE COMPR W4INXL10YD WHITE/BEIGE E MTRX HK LOOP CLSR

## (undated) DEVICE — INTENDED FOR TISSUE SEPARATION, AND OTHER PROCEDURES THAT REQUIRE A SHARP SURGICAL BLADE TO PUNCTURE OR CUT.: Brand: BARD-PARKER ® STAINLESS STEEL BLADES

## (undated) DEVICE — ZIMMER® STERILE DISPOSABLE TOURNIQUET CUFF WITH PROTECTIVE SLEEVE AND PLC, DUAL PORT, SINGLE BLADDER, 34 IN. (86 CM)

## (undated) DEVICE — 3M™ STERI-DRAPE™ U-DRAPE 1015: Brand: STERI-DRAPE™

## (undated) DEVICE — Z DISCONTINUED PER MEDLINE USE 2741942 DRESSING AQUACEL 6 IN ALG W9XL15CM SIL CVR WTRPRF VIR BACT BARR ANTIMIC

## (undated) DEVICE — PAD,ABDOMINAL,5"X9",ST,LF,25/BX: Brand: MEDLINE INDUSTRIES, INC.

## (undated) DEVICE — CLOTH SURG PREP PREOPERATIVE CHLORHEXIDINE GLUC 2% READYPREP

## (undated) DEVICE — GLOVE ORTHO 7 1/2   MSG9475

## (undated) DEVICE — SET ORTHO STD STORTSTD1

## (undated) DEVICE — PADDING,UNDERCAST,COTTON, 4"X4YD STERILE: Brand: MEDLINE

## (undated) DEVICE — DRILL SYSTEM 7

## (undated) DEVICE — GOWN,AURORA,BRTHSLV,2XL,18/CS: Brand: MEDLINE

## (undated) DEVICE — SHEET,DRAPE,53X77,STERILE: Brand: MEDLINE

## (undated) DEVICE — BANDAGE,GAUZE,4.5"X4.1YD,STERILE,LF: Brand: MEDLINE

## (undated) DEVICE — APPLICATOR MEDICATED 26 CC SOLUTION HI LT ORNG CHLORAPREP

## (undated) DEVICE — SURGICAL PROCEDURE PACK BASIC

## (undated) DEVICE — 1000 S-DRAPE TOWEL DRAPE 10/BX: Brand: STERI-DRAPE™

## (undated) DEVICE — PATIENT RETURN ELECTRODE, SINGLE-USE, CONTACT QUALITY MONITORING, ADULT, WITH 9FT CORD, FOR PATIENTS WEIGING OVER 33LBS. (15KG): Brand: MEGADYNE

## (undated) DEVICE — GOWN,BREATHABLE SLV,AURORA,XLG,STRL: Brand: MEDLINE

## (undated) DEVICE — BANDAGE COMPR W6INXL12FT SMOOTH FOR LIMB EXSANG ESMARCH

## (undated) DEVICE — Z DISCONTINUED USE 2275686 GLOVE SURG SZ 8 L12IN FNGR THK13MIL WHT ISOLEX POLYISOPRENE

## (undated) DEVICE — GLOVE ORANGE PI 7 1/2   MSG9075

## (undated) DEVICE — DRAPE C ARM W41XL74IN UNIV MOB W RUBBERBAND CLP

## (undated) DEVICE — 3M™ IOBAN™ 2 ANTIMICROBIAL INCISE DRAPE 6650EZ: Brand: IOBAN™ 2

## (undated) DEVICE — BIT DRL L145MM DIA3.2MM ST QUIK CPL W/O STP REUSE

## (undated) DEVICE — DRIP REDUCTION MANIFOLD

## (undated) DEVICE — GAUZE,SPONGE,AVANT,4"X4",4PLY,STRL,10/TR: Brand: MEDLINE

## (undated) DEVICE — TUBING SUCT 12FR MAL ALUM SHFT FN CAP VENT UNIV CONN W/ OBT

## (undated) DEVICE — GLOVE ORTHO 7   MSG9470

## (undated) DEVICE — GOWN,BREATHABLE,IMP SLV 3XL AURORA: Brand: MEDLINE